# Patient Record
Sex: MALE | Race: WHITE | Employment: OTHER | URBAN - METROPOLITAN AREA
[De-identification: names, ages, dates, MRNs, and addresses within clinical notes are randomized per-mention and may not be internally consistent; named-entity substitution may affect disease eponyms.]

---

## 2017-02-15 ENCOUNTER — OFFICE VISIT (OUTPATIENT)
Dept: CARDIOLOGY CLINIC | Age: 57
End: 2017-02-15

## 2017-02-15 VITALS
WEIGHT: 183.9 LBS | BODY MASS INDEX: 28.87 KG/M2 | DIASTOLIC BLOOD PRESSURE: 86 MMHG | HEIGHT: 67 IN | SYSTOLIC BLOOD PRESSURE: 136 MMHG | OXYGEN SATURATION: 96 % | HEART RATE: 72 BPM | RESPIRATION RATE: 18 BRPM

## 2017-02-15 DIAGNOSIS — I10 ESSENTIAL HYPERTENSION: Primary | ICD-10-CM

## 2017-02-15 DIAGNOSIS — E78.00 HIGH CHOLESTEROL: ICD-10-CM

## 2017-02-15 DIAGNOSIS — I25.10 CORONARY ARTERY DISEASE INVOLVING NATIVE CORONARY ARTERY OF NATIVE HEART WITHOUT ANGINA PECTORIS: ICD-10-CM

## 2017-02-15 DIAGNOSIS — I47.1 SVT (SUPRAVENTRICULAR TACHYCARDIA) (HCC): ICD-10-CM

## 2017-02-15 DIAGNOSIS — I10 ESSENTIAL HYPERTENSION WITH GOAL BLOOD PRESSURE LESS THAN 140/90: ICD-10-CM

## 2017-02-15 DIAGNOSIS — I47.1 PAROXYSMAL SVT (SUPRAVENTRICULAR TACHYCARDIA) (HCC): ICD-10-CM

## 2017-02-15 RX ORDER — ATORVASTATIN CALCIUM 40 MG/1
40 TABLET, FILM COATED ORAL
Qty: 30 TAB | Refills: 12 | Status: SHIPPED | OUTPATIENT
Start: 2017-02-15 | End: 2017-08-23 | Stop reason: SDUPTHER

## 2017-02-15 RX ORDER — LISINOPRIL 20 MG/1
20 TABLET ORAL 2 TIMES DAILY
Qty: 60 TAB | Refills: 12 | Status: SHIPPED | OUTPATIENT
Start: 2017-02-15 | End: 2017-08-23 | Stop reason: SDUPTHER

## 2017-02-15 RX ORDER — METOPROLOL TARTRATE 25 MG/1
25 TABLET, FILM COATED ORAL 2 TIMES DAILY
Qty: 60 TAB | Refills: 12 | Status: SHIPPED | OUTPATIENT
Start: 2017-02-15 | End: 2017-08-23 | Stop reason: SDUPTHER

## 2017-02-15 RX ORDER — HYDROCHLOROTHIAZIDE 12.5 MG/1
TABLET ORAL
Qty: 30 TAB | Refills: 12 | Status: SHIPPED | OUTPATIENT
Start: 2017-02-15 | End: 2017-08-23 | Stop reason: SDUPTHER

## 2017-02-15 NOTE — MR AVS SNAPSHOT
Visit Information Date & Time Provider Department Dept. Phone Encounter #  
 2/15/2017  9:15 AM Momo Hood, [address] [place] [address] [facility] [phone] [id number] Your Appointments 8/23/2017  9:00 AM  
6 MONTH with Momo Hood MD  
[facility]) Appt Note: Per Dr Dimitri Thorpe $CP REM  
 [id number] [facility]  
[phone] [address] [facility] Upcoming Health Maintenance Date Due Hepatitis C Screening 1960 DTaP/Tdap/Td series (1 - Tdap) 8/16/1979 FOBT Q 1 YEAR AGE 50-75 8/16/2008 INFLUENZA AGE 9 TO ADULT 8/1/2016 Allergies as of 2/15/2017  Review Complete On: 2/15/2017 By: Melissa Arriaga NP No Known Allergies Current Immunizations  Reviewed on 7/26/2013 No immunizations on file. Not reviewed this visit You Were Diagnosed With   
  
 Codes Comments Essential hypertension    -  Primary ICD-10-CM: I10 
ICD-9-CM: 401.9 Coronary artery disease involving native coronary artery of native heart without angina pectoris     ICD-10-CM: I25.10 ICD-9-CM: 414.01 Essential hypertension with goal blood pressure less than 140/90     ICD-10-CM: I10 
ICD-9-CM: 401.9 Paroxysmal SVT (supraventricular tachycardia) (HCC)     ICD-10-CM: I47.1 ICD-9-CM: 427.0 High cholesterol     ICD-10-CM: E78.00 ICD-9-CM: 272.0 SVT (supraventricular tachycardia)     ICD-10-CM: I47.1 ICD-9-CM: 427.89 Vitals BP Pulse Resp Height(growth percentile) Weight(growth percentile) SpO2  
 136/86 (BP 1 Location: Right arm, BP Patient Position: Sitting) 72 18 5' 7\" (1.702 m) 183 lb 14.4 oz (83.4 kg) 96% BMI Smoking Status 28.8 kg/m2 Never Smoker Vitals History BMI and BSA Data Body Mass Index Body Surface Area  
 28.8 kg/m 2 1.99 m 2 Preferred Pharmacy Pharmacy Name Phone [address] [place] [address] [phone] Your Updated Medication List  
  
   
This list is accurate as of: 2/15/17  9:25 AM.  Always use your most recent med list.  
  
  
  
  
 aspirin 81 mg chewable tablet Take 1 Tab by mouth daily. atorvastatin 40 mg tablet Commonly known as:  LIPITOR Take 1 Tab by mouth nightly. Indications: mixed hyperlipidemia  
  
 hydroCHLOROthiazide 12.5 mg tablet Commonly known as:  HYDRODIURIL  
TAKE 1 TABLET BY MOUTH DAILY  
  
 lisinopril 20 mg tablet Commonly known as:  Hailey Primmer Take 1 Tab by mouth two (2) times a day. metoprolol tartrate 25 mg tablet Commonly known as:  LOPRESSOR Take 1 Tab by mouth two (2) times a day. nitroglycerin 0.4 mg SL tablet Commonly known as:  NITROSTAT  
1 Tab by SubLINGual route every five (5) minutes as needed for Chest Pain. Max 3 tabs a day Prescriptions Sent to Pharmacy Refills  
 hydroCHLOROthiazide (HYDRODIURIL) 12.5 mg tablet 12 Sig: TAKE 1 TABLET BY MOUTH DAILY Class: Normal  
 Pharmacy: [address] [place] [address] Ph #: [phone]  
 metoprolol tartrate (LOPRESSOR) 25 mg tablet 12 Sig: Take 1 Tab by mouth two (2) times a day. Class: Normal  
 Pharmacy: [address] [place] [address] Ph #: [phone] Route: Oral  
 atorvastatin (LIPITOR) 40 mg tablet 12 Sig: Take 1 Tab by mouth nightly. Indications: mixed hyperlipidemia Class: Normal  
 Pharmacy: [address] [place] [address] #: [phone] Route: Oral  
 lisinopril (PRINIVIL, ZESTRIL) 20 mg tablet 12 Sig: Take 1 Tab by mouth two (2) times a day. Class: Normal  
 Pharmacy: [address] [place] [address] #: [phone] Route: Oral  
  
We Performed the Following AMB POC EKG ROUTINE W/ 12 LEADS, INTER & REP [23987 CPT(R)] Introducing \[facility] [place] [facility]\"" & HEALTH SERVICES! Oscar Daisy introduces [employer] patient portal. Now you can access parts of your medical record, email your doctor's office, and request medication refills online. 1. In your internet browser, go to [email or web address] 2. Click on the First Time User? Click Here link in the Sign In box. You will see the New Member Sign Up page. 3. Enter your V Wave Access Code exactly as it appears below. You will not need to use this code after youve completed the sign-up process. If you do not sign up before the expiration date, you must request a new code. · V Wave Access Code: [email or web address] Expires: 5/16/2017  8:57 AM 
 
4. Enter the last four digits of your Social Security Number (xxxx) and Date of Birth (mm/dd/yyyy) as indicated and click Submit. You will be taken to the next sign-up page. 5. Create a V Wave ID. This will be your V Wave login ID and cannot be changed, so think of one that is secure and easy to remember. 6. Create a V Wave password. You can change your password at any time. 7. Enter your Password Reset Question and Answer. This can be used at a later time if you forget your password. 8. Enter your e-mail address. You will receive e-mail notification when new information is available in [address] 9. Click Sign Up. You can now view and download portions of your medical record. 10. Click the Download Summary menu link to download a portable copy of your medical information. If you have questions, please visit the Frequently Asked Questions section of the V Wave website. Remember, [employer] Wave is NOT to be used for urgent needs. For medical emergencies, dial 911. Now available from your iPhone and Android! Please provide this summary of care documentation to your next provider. Your primary care clinician is listed as [address]. If you have any questions after today's visit, please call [phone].

## 2017-02-15 NOTE — PROGRESS NOTES
Blanquita Height NP  Subjective/HPI:     Leeanna Gracia is a 60 y.o. male is here for routine f/u. The patient denies chest pain/ shortness of breath, orthopnea, PND, LE edema, palpitations, syncope, presyncope or fatigue. Mr. Melanie Dawson reports feeling in his usual state of health. 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 Primary care following lipids. PCP Provider  Kerwin Ramachandran MD  Past Medical History   Diagnosis Date    CAD (coronary artery disease)     Hypertension       Past Surgical History   Procedure Laterality Date    Pr neurological procedure unlisted       nail removal from skull    Pr cardiac surg procedure unlist       1 stent     No Known Allergies   Family History   Problem Relation Age of Onset    Heart Disease Mother     Heart Disease Father       Current Outpatient Prescriptions   Medication Sig    hydroCHLOROthiazide (HYDRODIURIL) 12.5 mg tablet TAKE 1 TABLET BY MOUTH DAILY    metoprolol tartrate (LOPRESSOR) 25 mg tablet Take 1 Tab by mouth two (2) times a day.  atorvastatin (LIPITOR) 40 mg tablet Take 1 Tab by mouth nightly. Indications: mixed hyperlipidemia    lisinopril (PRINIVIL, ZESTRIL) 20 mg tablet Take 1 Tab by mouth two (2) times a day.  nitroglycerin (NITROSTAT) 0.4 mg SL tablet 1 Tab by SubLINGual route every five (5) minutes as needed for Chest Pain. Max 3 tabs a day    aspirin 81 mg chewable tablet Take 1 Tab by mouth daily. No current facility-administered medications for this visit. Vitals:    02/15/17 0900 02/15/17 0906   BP: 138/88 136/86   Pulse: 72    Resp: 18    SpO2: 96%    Weight: 183 lb 14.4 oz (83.4 kg)    Height: 5' 7\" (1.702 m)      Social History     Social History    Marital status:      Spouse name: N/A    Number of children: N/A    Years of education: N/A     Occupational History    Not on file. Social History Main Topics    Smoking status: Never Smoker    Smokeless tobacco: Never Used    Alcohol use No    Drug use: No    Sexual activity: Not on file     Other Topics Concern    Not on file     Social History Narrative    ** Merged History Encounter **            I have reviewed the nurses notes, vitals, problem list, allergy list, medical history, family, social history and medications. Review of Symptoms:    General: Pt denies excessive weight gain or loss. Pt is able to conduct ADL's  HEENT: Denies blurred vision, headaches, epistaxis and difficulty swallowing. Respiratory: Denies shortness of breath, HINOJOSA, wheezing or stridor. Cardiovascular: Denies precordial pain, palpitations, edema or PND  Gastrointestinal: Denies poor appetite, indigestion, abdominal pain or blood in stool  Musculoskeletal: Denies pain or swelling from muscles or joints  Neurologic: Denies tremor, paresthesias, or sensory motor disturbance  Skin: Denies rash, itching or texture change. Physical Exam:      General: Well developed, in no acute distress, cooperative and alert  HEENT: No carotid bruits, no JVD, trach is midline. Neck Supple, PEERL, EOM intact. Heart:  Normal S1/S2 negative S3 or S4. Regular, no murmur, gallop or rub.   Respiratory: Clear bilaterally x 4, no wheezing or rales  Abdomen:   Soft, non-tender, no masses, bowel sounds are active.   Extremities:  No edema, normal cap refill, no cyanosis, atraumatic. Neuro: A&Ox3, speech clear, gait stable. Skin: Skin color is normal. No rashes or lesions. Non diaphoretic  Vascular: 2+ pulses symmetric in all extremities    Cardiographics    ECG: Normal sinus rhythm, right bundle branch block unchanged from previous tracings.   Results for orders placed or performed during the hospital encounter of 07/23/13   EKG, 12 LEAD, INITIAL   Result Value Ref Range    Ventricular Rate 64 BPM    Atrial Rate 64 BPM    P-R Interval 168 ms    QRS Duration 110 ms    Q-T Interval 460 ms    QTC Calculation (Bezet) 474 ms    Calculated P Axis 34 degrees    Calculated R Axis 44 degrees    Calculated T Axis 52 degrees    Diagnosis       Normal sinus rhythm  Incomplete right bundle branch block  When compared with ECG of 23-JUL-2013 17:43,  No significant change  Confirmed by Alberta Martinez ([id number]) on 7/24/2013 9:07:36 AM         Cardiology Labs:  Lab Results   Component Value Date/Time    Cholesterol, total 140 10/15/2014 10:38 AM    HDL Cholesterol 55 10/15/2014 10:38 AM    LDL, calculated 71 10/15/2014 10:38 AM    Triglyceride 72 10/15/2014 10:38 AM    CHOL/HDL Ratio 4.5 07/24/2013 05:15 AM       Lab Results   Component Value Date/Time    Sodium 141 10/15/2014 10:38 AM    Potassium 4.4 10/15/2014 10:38 AM    Chloride 98 10/15/2014 10:38 AM    CO2 27 10/15/2014 10:38 AM    Anion gap 7 08/05/2013 08:30 PM    Glucose 101 10/15/2014 10:38 AM    BUN 20 10/15/2014 10:38 AM    Creatinine 0.80 10/15/2014 10:38 AM    BUN/Creatinine ratio 25 10/15/2014 10:38 AM    GFR est  10/15/2014 10:38 AM    GFR est non- 10/15/2014 10:38 AM    Calcium 10.0 10/15/2014 10:38 AM    Bilirubin, total 0.9 10/15/2014 10:38 AM    AST (SGOT) 12 10/15/2014 10:38 AM    Alk. phosphatase 80 10/15/2014 10:38 AM    Protein, total 6.4 10/15/2014 10:38 AM    Albumin 4.6 10/15/2014 10:38 AM    Globulin 3.2 08/05/2013 08:30 PM    A-G Ratio 2.6 10/15/2014 10:38 AM    ALT (SGPT) 15 10/15/2014 10:38 AM           Assessment:     Assessment:     Chapo was seen today for hypertension. Diagnoses and all orders for this visit:    Essential hypertension  -     AMB POC EKG ROUTINE W/ 12 LEADS, INTER & REP  -     atorvastatin (LIPITOR) 40 mg tablet; Take 1 Tab by mouth nightly. Indications: mixed hyperlipidemia  -     lisinopril (PRINIVIL, ZESTRIL) 20 mg tablet; Take 1 Tab by mouth two (2) times a day.     Coronary artery disease involving native coronary artery of native heart without angina pectoris  - metoprolol tartrate (LOPRESSOR) 25 mg tablet; Take 1 Tab by mouth two (2) times a day. -     atorvastatin (LIPITOR) 40 mg tablet; Take 1 Tab by mouth nightly. Indications: mixed hyperlipidemia  -     lisinopril (PRINIVIL, ZESTRIL) 20 mg tablet; Take 1 Tab by mouth two (2) times a day. Essential hypertension with goal blood pressure less than 140/90  -     metoprolol tartrate (LOPRESSOR) 25 mg tablet; Take 1 Tab by mouth two (2) times a day. Paroxysmal SVT (supraventricular tachycardia) (HCC)  -     metoprolol tartrate (LOPRESSOR) 25 mg tablet; Take 1 Tab by mouth two (2) times a day. -     atorvastatin (LIPITOR) 40 mg tablet; Take 1 Tab by mouth nightly. Indications: mixed hyperlipidemia  -     lisinopril (PRINIVIL, ZESTRIL) 20 mg tablet; Take 1 Tab by mouth two (2) times a day. High cholesterol  -     metoprolol tartrate (LOPRESSOR) 25 mg tablet; Take 1 Tab by mouth two (2) times a day. -     atorvastatin (LIPITOR) 40 mg tablet; Take 1 Tab by mouth nightly. Indications: mixed hyperlipidemia  -     lisinopril (PRINIVIL, ZESTRIL) 20 mg tablet; Take 1 Tab by mouth two (2) times a day. SVT (supraventricular tachycardia)  -     metoprolol tartrate (LOPRESSOR) 25 mg tablet; Take 1 Tab by mouth two (2) times a day. -     atorvastatin (LIPITOR) 40 mg tablet; Take 1 Tab by mouth nightly. Indications: mixed hyperlipidemia  -     lisinopril (PRINIVIL, ZESTRIL) 20 mg tablet; Take 1 Tab by mouth two (2) times a day. Other orders  -     hydroCHLOROthiazide (HYDRODIURIL) 12.5 mg tablet; TAKE 1 TABLET BY MOUTH DAILY        ICD-10-CM ICD-9-CM    1. Essential hypertension I10 401.9 AMB POC EKG ROUTINE W/ 12 LEADS, INTER & REP      atorvastatin (LIPITOR) 40 mg tablet      lisinopril (PRINIVIL, ZESTRIL) 20 mg tablet   2.  Coronary artery disease involving native coronary artery of native heart without angina pectoris I25.10 414.01 metoprolol tartrate (LOPRESSOR) 25 mg tablet      atorvastatin (LIPITOR) 40 mg tablet lisinopril (PRINIVIL, ZESTRIL) 20 mg tablet   3. Essential hypertension with goal blood pressure less than 140/90 I10 401.9 metoprolol tartrate (LOPRESSOR) 25 mg tablet   4. Paroxysmal SVT (supraventricular tachycardia) (HCC) I47.1 427.0 metoprolol tartrate (LOPRESSOR) 25 mg tablet      atorvastatin (LIPITOR) 40 mg tablet      lisinopril (PRINIVIL, ZESTRIL) 20 mg tablet   5. High cholesterol E78.00 272.0 metoprolol tartrate (LOPRESSOR) 25 mg tablet      atorvastatin (LIPITOR) 40 mg tablet      lisinopril (PRINIVIL, ZESTRIL) 20 mg tablet   6. SVT (supraventricular tachycardia) I47.1 427.89 metoprolol tartrate (LOPRESSOR) 25 mg tablet      atorvastatin (LIPITOR) 40 mg tablet      lisinopril (PRINIVIL, ZESTRIL) 20 mg tablet     Orders Placed This Encounter    AMB POC EKG ROUTINE W/ 12 LEADS, INTER & REP     Order Specific Question:   Reason for Exam:     Answer:   routine    hydroCHLOROthiazide (HYDRODIURIL) 12.5 mg tablet     Sig: TAKE 1 TABLET BY MOUTH DAILY     Dispense:  30 Tab     Refill:  12    metoprolol tartrate (LOPRESSOR) 25 mg tablet     Sig: Take 1 Tab by mouth two (2) times a day. Dispense:  60 Tab     Refill:  12    atorvastatin (LIPITOR) 40 mg tablet     Sig: Take 1 Tab by mouth nightly. Indications: mixed hyperlipidemia     Dispense:  30 Tab     Refill:  12    lisinopril (PRINIVIL, ZESTRIL) 20 mg tablet     Sig: Take 1 Tab by mouth two (2) times a day. Dispense:  60 Tab     Refill:  12        Plan:     Patient presents doing well and is stable from cardiac stand point. Continue current care and f/u in 6 months. 1.  Atherosclerotic heart disease: Clinically stable continue current therapy. 2.  Hypertension controlled continue current medications  3. Hyperlipidemia: On atorvastatin 40 mg lipids per primary care  4. History of PSVT: Sinus rhythm with right bundle branch block on EKG; no symptoms reported suggestive of breakthrough.       Eric Roberts NP

## 2017-02-15 NOTE — PATIENT INSTRUCTIONS

## 2017-08-23 ENCOUNTER — OFFICE VISIT (OUTPATIENT)
Dept: CARDIOLOGY CLINIC | Age: 57
End: 2017-08-23

## 2017-08-23 VITALS
DIASTOLIC BLOOD PRESSURE: 86 MMHG | OXYGEN SATURATION: 99 % | RESPIRATION RATE: 16 BRPM | HEIGHT: 67 IN | WEIGHT: 185 LBS | SYSTOLIC BLOOD PRESSURE: 124 MMHG | BODY MASS INDEX: 29.03 KG/M2 | HEART RATE: 61 BPM

## 2017-08-23 DIAGNOSIS — I10 ESSENTIAL HYPERTENSION: ICD-10-CM

## 2017-08-23 DIAGNOSIS — I25.10 CORONARY ARTERY DISEASE INVOLVING NATIVE CORONARY ARTERY OF NATIVE HEART WITHOUT ANGINA PECTORIS: Primary | ICD-10-CM

## 2017-08-23 DIAGNOSIS — I47.1 PAROXYSMAL SVT (SUPRAVENTRICULAR TACHYCARDIA) (HCC): ICD-10-CM

## 2017-08-23 DIAGNOSIS — E78.00 HIGH CHOLESTEROL: ICD-10-CM

## 2017-08-23 RX ORDER — HYDROCHLOROTHIAZIDE 12.5 MG/1
TABLET ORAL
Qty: 90 TAB | Refills: 3 | Status: SHIPPED | OUTPATIENT
Start: 2017-08-23 | End: 2018-02-28 | Stop reason: SDUPTHER

## 2017-08-23 RX ORDER — LISINOPRIL 20 MG/1
20 TABLET ORAL DAILY
Qty: 90 TAB | Refills: 3 | Status: SHIPPED | OUTPATIENT
Start: 2017-08-23 | End: 2018-02-28 | Stop reason: SDUPTHER

## 2017-08-23 RX ORDER — ATORVASTATIN CALCIUM 40 MG/1
40 TABLET, FILM COATED ORAL
Qty: 90 TAB | Refills: 3 | Status: SHIPPED | OUTPATIENT
Start: 2017-08-23 | End: 2018-02-28 | Stop reason: SDUPTHER

## 2017-08-23 RX ORDER — NITROGLYCERIN 0.4 MG/1
0.4 TABLET SUBLINGUAL
Qty: 1 BOTTLE | Refills: 1 | Status: SHIPPED | OUTPATIENT
Start: 2017-08-23 | End: 2018-02-28 | Stop reason: SDUPTHER

## 2017-08-23 RX ORDER — METOPROLOL TARTRATE 25 MG/1
12.5 TABLET, FILM COATED ORAL 2 TIMES DAILY
Qty: 90 TAB | Refills: 3 | Status: SHIPPED | OUTPATIENT
Start: 2017-08-23 | End: 2018-02-28 | Stop reason: SDUPTHER

## 2017-08-23 RX ORDER — METOPROLOL TARTRATE 50 MG/1
TABLET ORAL
Refills: 0 | COMMUNITY
Start: 2017-07-12 | End: 2017-08-23

## 2017-08-23 NOTE — LETTER
8/23/2017 9:30 AM 
 
Patient:  Jamila Lake YOB: 1960 Date of Visit: 8/23/2017 Dear Clint Garza MD 
[address] [place] [address] Road [address] [place] [id number] VIA In Basket 
 : Thank you for referring Mr. Mecca Frankel to me for evaluation/treatment. Below are the relevant portions of my assessment and plan of care. If you have questions, please do not hesitate to call me. I look forward to following Mr. Graceann Jeans along with you.  
 
 
 
Sincerely, 
 
 
Odalis Patrick MD

## 2017-08-23 NOTE — PROGRESS NOTES
Chief Complaint   Patient presents with    Hypertension     6 mo f/u    Coronary Artery Disease     \"

## 2017-08-23 NOTE — PROGRESS NOTES
Subjective/HPI:     Gema Schmitt is a 59 y.o. male is here for f/u appt. All of his medications that he was taking twice a day he cut back to once a day d/t fatigue. Since he made that change he has felt better. He has checked his BP at home and has been trending normal. The patient denies chest pain/ shortness of breath, orthopnea, PND, LE edema, palpitations, syncope, or presyncope. PCP Provider  Melia Harris MD  Past Medical History:   Diagnosis Date    CAD (coronary artery disease)     Hypertension       Past Surgical History:   Procedure Laterality Date    CARDIAC SURG PROCEDURE UNLIST      1 stent    NEUROLOGICAL PROCEDURE UNLISTED      nail removal from skull     No Known Allergies   Family History   Problem Relation Age of Onset    Heart Disease Mother     Heart Disease Father       Current Outpatient Prescriptions   Medication Sig    hydroCHLOROthiazide (HYDRODIURIL) 12.5 mg tablet TAKE 1 TABLET BY MOUTH DAILY    metoprolol tartrate (LOPRESSOR) 25 mg tablet Take 0.5 Tabs by mouth two (2) times a day.  atorvastatin (LIPITOR) 40 mg tablet Take 1 Tab by mouth nightly. Indications: mixed hyperlipidemia    lisinopril (PRINIVIL, ZESTRIL) 20 mg tablet Take 1 Tab by mouth daily.  nitroglycerin (NITROSTAT) 0.4 mg SL tablet 1 Tab by SubLINGual route every five (5) minutes as needed for Chest Pain. Max 3 tabs a day    aspirin 81 mg chewable tablet Take 1 Tab by mouth daily. No current facility-administered medications for this visit. Vitals:    08/23/17 0843 08/23/17 0858   BP: 118/90 124/86   Pulse: 61    Resp: 16    SpO2: 99%    Weight: 185 lb (83.9 kg)    Height: 5' 7\" (1.702 m)      Social History     Social History    Marital status:      Spouse name: N/A    Number of children: N/A    Years of education: N/A     Occupational History    Not on file.      Social History Main Topics    Smoking status: Never Smoker    Smokeless tobacco: Never Used  Alcohol use No    Drug use: No    Sexual activity: Not on file     Other Topics Concern    Not on file     Social History Narrative    ** Merged History Encounter **            I have reviewed the nurses notes, vitals, problem list, allergy list, medical history, family, social history and medications. Review of Symptoms:    General: Pt denies excessive weight gain or loss. Pt is able to conduct ADL's  HEENT: Denies blurred vision, headaches, epistaxis and difficulty swallowing. Respiratory: Denies shortness of breath, HINOJOSA, wheezing or stridor. Cardiovascular: Denies precordial pain, palpitations, edema or PND  Gastrointestinal: Denies poor appetite, indigestion, abdominal pain or blood in stool  Urinary: Denies dysuria, pyuria  Musculoskeletal: Denies pain or swelling from muscles or joints  Neurologic: Denies tremor, paresthesias, or sensory motor disturbance  Skin: Denies rash, itching or texture change. Psych: Denies depression        Physical Exam:      General: Well developed, in no acute distress, cooperative and alert  HEENT: No carotid bruits, no JVD, trach is midline. Neck Supple, PEERL, EOM intact. Heart:  Normal S1/S2 negative S3 or S4. Regular, no murmur, gallop or rub.   Respiratory: Clear bilaterally x 4, no wheezing or rales  Abdomen:   Soft, non-tender, no masses, bowel sounds are active.   Extremities:  No edema, normal cap refill, no cyanosis, atraumatic. Neuro: A&Ox3, speech clear, gait stable. Skin: Skin color is normal. No rashes or lesions. Non diaphoretic  Vascular: 2+ pulses symmetric in all extremities    Cardiographics    ECG: Sinus  Rhythm HR 61  -Right bundle branch block.      Results for orders placed or performed during the hospital encounter of 07/23/13   EKG, 12 LEAD, INITIAL   Result Value Ref Range    Ventricular Rate 64 BPM    Atrial Rate 64 BPM    P-R Interval 168 ms    QRS Duration 110 ms    Q-T Interval 460 ms    QTC Calculation (Bezet) 474 ms    Calculated P Axis 34 degrees    Calculated R Axis 44 degrees    Calculated T Axis 52 degrees    Diagnosis       Normal sinus rhythm  Incomplete right bundle branch block  When compared with ECG of 23-JUL-2013 17:43,  No significant change  Confirmed by Alfonso Harrington ([id number]) on 7/24/2013 9:07:36 AM         Cardiology Labs:  Lab Results   Component Value Date/Time    Cholesterol, total 140 10/15/2014 10:38 AM    HDL Cholesterol 55 10/15/2014 10:38 AM    LDL, calculated 71 10/15/2014 10:38 AM    Triglyceride 72 10/15/2014 10:38 AM    CHOL/HDL Ratio 4.5 07/24/2013 05:15 AM       Lab Results   Component Value Date/Time    Sodium 141 10/15/2014 10:38 AM    Potassium 4.4 10/15/2014 10:38 AM    Chloride 98 10/15/2014 10:38 AM    CO2 27 10/15/2014 10:38 AM    Anion gap 7 08/05/2013 08:30 PM    Glucose 101 10/15/2014 10:38 AM    BUN 20 10/15/2014 10:38 AM    Creatinine 0.80 10/15/2014 10:38 AM    BUN/Creatinine ratio 25 10/15/2014 10:38 AM    GFR est  10/15/2014 10:38 AM    GFR est non- 10/15/2014 10:38 AM    Calcium 10.0 10/15/2014 10:38 AM    AST (SGOT) 12 10/15/2014 10:38 AM    Alk. phosphatase 80 10/15/2014 10:38 AM    Protein, total 6.4 10/15/2014 10:38 AM    Albumin 4.6 10/15/2014 10:38 AM    Globulin 3.2 08/05/2013 08:30 PM    A-G Ratio 2.6 10/15/2014 10:38 AM    ALT (SGPT) 15 10/15/2014 10:38 AM           Assessment:     Assessment:     Diagnoses and all orders for this visit:    1. Coronary artery disease involving native coronary artery of native heart without angina pectoris  -     metoprolol tartrate (LOPRESSOR) 25 mg tablet; Take 0.5 Tabs by mouth two (2) times a day. -     atorvastatin (LIPITOR) 40 mg tablet; Take 1 Tab by mouth nightly. Indications: mixed hyperlipidemia  -     lisinopril (PRINIVIL, ZESTRIL) 20 mg tablet; Take 1 Tab by mouth daily. 2. Essential hypertension  -     AMB POC EKG ROUTINE W/ 12 LEADS, INTER & REP  -     atorvastatin (LIPITOR) 40 mg tablet; Take 1 Tab by mouth nightly. Indications: mixed hyperlipidemia  -     lisinopril (PRINIVIL, ZESTRIL) 20 mg tablet; Take 1 Tab by mouth daily. 3. High cholesterol  -     metoprolol tartrate (LOPRESSOR) 25 mg tablet; Take 0.5 Tabs by mouth two (2) times a day. -     atorvastatin (LIPITOR) 40 mg tablet; Take 1 Tab by mouth nightly. Indications: mixed hyperlipidemia  -     lisinopril (PRINIVIL, ZESTRIL) 20 mg tablet; Take 1 Tab by mouth daily. 4. Paroxysmal SVT (supraventricular tachycardia) (HCC)  -     metoprolol tartrate (LOPRESSOR) 25 mg tablet; Take 0.5 Tabs by mouth two (2) times a day. -     atorvastatin (LIPITOR) 40 mg tablet; Take 1 Tab by mouth nightly. Indications: mixed hyperlipidemia  -     lisinopril (PRINIVIL, ZESTRIL) 20 mg tablet; Take 1 Tab by mouth daily. Other orders  -     hydroCHLOROthiazide (HYDRODIURIL) 12.5 mg tablet; TAKE 1 TABLET BY MOUTH DAILY  -     nitroglycerin (NITROSTAT) 0.4 mg SL tablet; 1 Tab by SubLINGual route every five (5) minutes as needed for Chest Pain. Max 3 tabs a day        ICD-10-CM ICD-9-CM    1. Coronary artery disease involving native coronary artery of native heart without angina pectoris I25.10 414.01 metoprolol tartrate (LOPRESSOR) 25 mg tablet      atorvastatin (LIPITOR) 40 mg tablet      lisinopril (PRINIVIL, ZESTRIL) 20 mg tablet   2. Essential hypertension I10 401.9 AMB POC EKG ROUTINE W/ 12 LEADS, INTER & REP      atorvastatin (LIPITOR) 40 mg tablet      lisinopril (PRINIVIL, ZESTRIL) 20 mg tablet   3. High cholesterol E78.00 272.0 metoprolol tartrate (LOPRESSOR) 25 mg tablet      atorvastatin (LIPITOR) 40 mg tablet      lisinopril (PRINIVIL, ZESTRIL) 20 mg tablet   4.  Paroxysmal SVT (supraventricular tachycardia) (HCC) I47.1 427.0 metoprolol tartrate (LOPRESSOR) 25 mg tablet      atorvastatin (LIPITOR) 40 mg tablet      lisinopril (PRINIVIL, ZESTRIL) 20 mg tablet     Orders Placed This Encounter    AMB POC EKG ROUTINE W/ 12 LEADS, INTER & REP     Order Specific Question:   Reason for Exam:     Answer:   Routine    DISCONTD: metoprolol tartrate (LOPRESSOR) 50 mg tablet     Sig: take 1 tablet by mouth twice a day with MEALS     Refill:  0    hydroCHLOROthiazide (HYDRODIURIL) 12.5 mg tablet     Sig: TAKE 1 TABLET BY MOUTH DAILY     Dispense:  90 Tab     Refill:  3    metoprolol tartrate (LOPRESSOR) 25 mg tablet     Sig: Take 0.5 Tabs by mouth two (2) times a day. Dispense:  90 Tab     Refill:  3    atorvastatin (LIPITOR) 40 mg tablet     Sig: Take 1 Tab by mouth nightly. Indications: mixed hyperlipidemia     Dispense:  90 Tab     Refill:  3    lisinopril (PRINIVIL, ZESTRIL) 20 mg tablet     Sig: Take 1 Tab by mouth daily. Dispense:  90 Tab     Refill:  3    nitroglycerin (NITROSTAT) 0.4 mg SL tablet     Si Tab by SubLINGual route every five (5) minutes as needed for Chest Pain. Max 3 tabs a day     Dispense:  1 Bottle     Refill:  1        Plan:   Patient presents today for f/u appt and denies cardiac complaints. Continue current care and f/u in 6 months. 1.  Atherosclerotic heart disease: Denies CP, fatigue improved with reduction in Metoprolol. Advised he take half tab bid d/t short duration formula  2. Hypertension well controlled continue current medications  3. Hyperlipidemia: On atorvastatin 40 mg lipids per primary care and at goal . Trig 138, HDL 43, LDL 70  4.   History of PSVT: Remains in Sinus rhythm with RBBB on EKG         Gregorio Britton NP

## 2017-08-23 NOTE — MR AVS SNAPSHOT
Visit Information Date & Time Provider Department Dept. Phone Encounter #  
 8/23/2017  9:00 AM Vito Albert, [address] [facility] [phone] [id number] Upcoming Health Maintenance Date Due Hepatitis C Screening 1960 DTaP/Tdap/Td series (1 - Tdap) 8/16/1979 FOBT Q 1 YEAR AGE 50-75 8/16/2008 INFLUENZA AGE 9 TO ADULT 8/1/2017 Allergies as of 8/23/2017  Review Complete On: 8/23/2017 By: Cherry Gutierrez NP No Known Allergies Current Immunizations  Reviewed on 7/26/2013 No immunizations on file. Not reviewed this visit You Were Diagnosed With   
  
 Codes Comments Essential hypertension    -  Primary ICD-10-CM: I10 
ICD-9-CM: 401.9 Coronary artery disease involving native coronary artery of native heart without angina pectoris     ICD-10-CM: I25.10 ICD-9-CM: 414.01 Essential hypertension with goal blood pressure less than 140/90     ICD-10-CM: I10 
ICD-9-CM: 401.9 Paroxysmal SVT (supraventricular tachycardia) (HCC)     ICD-10-CM: I47.1 ICD-9-CM: 427.0 High cholesterol     ICD-10-CM: E78.00 ICD-9-CM: 272.0 SVT (supraventricular tachycardia) (HCC)     ICD-10-CM: I47.1 ICD-9-CM: 427.89 Vitals BP Pulse Resp Height(growth percentile) Weight(growth percentile) SpO2  
 124/86 (BP 1 Location: Left arm, BP Patient Position: Sitting) 61 16 5' 7\" (1.702 m) 185 lb (83.9 kg) 99% BMI Smoking Status 28.98 kg/m2 Never Smoker Vitals History BMI and BSA Data Body Mass Index Body Surface Area  
 28.98 kg/m 2 1.99 m 2 Preferred Pharmacy Pharmacy Name Phone [facility], [address] [phone] Your Updated Medication List  
  
   
This list is accurate as of: 8/23/17  9:25 AM.  Always use your most recent med list.  
  
  
  
  
 aspirin 81 mg chewable tablet Take 1 Tab by mouth daily. atorvastatin 40 mg tablet Commonly known as:  LIPITOR Take 1 Tab by mouth nightly. Indications: mixed hyperlipidemia  
  
 hydroCHLOROthiazide 12.5 mg tablet Commonly known as:  HYDRODIURIL  
TAKE 1 TABLET BY MOUTH DAILY  
  
 lisinopril 20 mg tablet Commonly known as:  Brian Human Take 1 Tab by mouth daily. metoprolol tartrate 25 mg tablet Commonly known as:  LOPRESSOR Take 0.5 Tabs by mouth two (2) times a day. nitroglycerin 0.4 mg SL tablet Commonly known as:  NITROSTAT  
1 Tab by SubLINGual route every five (5) minutes as needed for Chest Pain. Max 3 tabs a day Prescriptions Sent to Pharmacy Refills  
 hydroCHLOROthiazide (HYDRODIURIL) 12.5 mg tablet 3 Sig: TAKE 1 TABLET BY MOUTH DAILY Class: Normal  
 Pharmacy: [address] [place] [address] #: [phone]  
 metoprolol tartrate (LOPRESSOR) 25 mg tablet 3 Sig: Take 0.5 Tabs by mouth two (2) times a day. Class: Normal  
 Pharmacy: [address] [place] [address] #: [phone] Route: Oral  
 atorvastatin (LIPITOR) 40 mg tablet 3 Sig: Take 1 Tab by mouth nightly. Indications: mixed hyperlipidemia Class: Normal  
 Pharmacy: [address] [place] [address] #: [phone] Route: Oral  
 lisinopril (PRINIVIL, ZESTRIL) 20 mg tablet 3 Sig: Take 1 Tab by mouth daily. Class: Normal  
 Pharmacy: [address] [place] [address] #: [phone] Route: Oral  
 nitroglycerin (NITROSTAT) 0.4 mg SL tablet 1 Si Tab by SubLINGual route every five (5) minutes as needed for Chest Pain. Max 3 tabs a day Class: Normal  
 Pharmacy: [address] [place] [address] #: [phone] Route: SubLINGual  
  
We Performed the Following AMB POC EKG ROUTINE  LEADS, INTER & REP [35341 CPT(R)] Introducing [facility]! Jass Gil introduces ThisLife patient portal. Now you can access parts of your medical record, email your doctor's office, and request medication refills online. 1. In your internet browser, go to [email or web address] 2. Click on the First Time User? Click Here link in the Sign In box. You will see the New Member Sign Up page. 3. Enter your ThisLife Access Code exactly as it appears below. You will not need to use this code after youve completed the sign-up process. If you do not sign up before the expiration date, you must request a new code. · ThisLife Access Code: [email or web address] Expires: 11/21/2017  9:25 AM 
 
4. Enter the last four digits of your Social Security Number (xxxx) and Date of Birth (mm/dd/yyyy) as indicated and click Submit. You will be taken to the next sign-up page. 5. Create a ThisLife ID. This will be your ThisLife login ID and cannot be changed, so think of one that is secure and easy to remember. 6. Create a ThisLife password. You can change your password at any time. 7. Enter your Password Reset Question and Answer. This can be used at a later time if you forget your password. 8. Enter your e-mail address. You will receive e-mail notification when new information is available in [address] 9. Click Sign Up. You can now view and download portions of your medical record. 10. Click the Download Summary menu link to download a portable copy of your medical information. If you have questions, please visit the Frequently Asked Questions section of the ThisLife website. Remember, ThisLife is NOT to be used for urgent needs. For medical emergencies, dial 911. Now available from your iPhone and Android! Please provide this summary of care documentation to your next provider. Your primary care clinician is listed as [address]. If you have any questions after today's visit, please call [phone].

## 2018-02-28 ENCOUNTER — OFFICE VISIT (OUTPATIENT)
Dept: CARDIOLOGY CLINIC | Age: 58
End: 2018-02-28

## 2018-02-28 VITALS
BODY MASS INDEX: 30.31 KG/M2 | RESPIRATION RATE: 18 BRPM | HEART RATE: 72 BPM | SYSTOLIC BLOOD PRESSURE: 142 MMHG | OXYGEN SATURATION: 97 % | DIASTOLIC BLOOD PRESSURE: 86 MMHG | WEIGHT: 193.1 LBS | HEIGHT: 67 IN

## 2018-02-28 DIAGNOSIS — E78.00 HIGH CHOLESTEROL: ICD-10-CM

## 2018-02-28 DIAGNOSIS — I47.1 PAROXYSMAL SVT (SUPRAVENTRICULAR TACHYCARDIA) (HCC): ICD-10-CM

## 2018-02-28 DIAGNOSIS — I10 ESSENTIAL HYPERTENSION: ICD-10-CM

## 2018-02-28 DIAGNOSIS — I25.10 CORONARY ARTERY DISEASE INVOLVING NATIVE CORONARY ARTERY OF NATIVE HEART WITHOUT ANGINA PECTORIS: Primary | ICD-10-CM

## 2018-02-28 RX ORDER — ATORVASTATIN CALCIUM 40 MG/1
40 TABLET, FILM COATED ORAL
Qty: 90 TAB | Refills: 3 | Status: SHIPPED | OUTPATIENT
Start: 2018-02-28 | End: 2019-03-23 | Stop reason: SDUPTHER

## 2018-02-28 RX ORDER — HYDROCHLOROTHIAZIDE 12.5 MG/1
TABLET ORAL
Qty: 90 TAB | Refills: 3 | Status: SHIPPED | OUTPATIENT
Start: 2018-02-28 | End: 2019-04-03 | Stop reason: SDUPTHER

## 2018-02-28 RX ORDER — NITROGLYCERIN 0.4 MG/1
0.4 TABLET SUBLINGUAL
Qty: 1 BOTTLE | Refills: 1 | Status: SHIPPED | OUTPATIENT
Start: 2018-02-28 | End: 2019-04-03 | Stop reason: SDUPTHER

## 2018-02-28 RX ORDER — METOPROLOL TARTRATE 25 MG/1
12.5 TABLET, FILM COATED ORAL 2 TIMES DAILY
Qty: 90 TAB | Refills: 3 | Status: SHIPPED | OUTPATIENT
Start: 2018-02-28 | End: 2019-03-09 | Stop reason: SDUPTHER

## 2018-02-28 RX ORDER — LISINOPRIL 20 MG/1
20 TABLET ORAL DAILY
Qty: 90 TAB | Refills: 3 | Status: SHIPPED | OUTPATIENT
Start: 2018-02-28 | End: 2019-03-23 | Stop reason: SDUPTHER

## 2018-02-28 NOTE — PROGRESS NOTES
1. Have you been to the ER, urgent care clinic since your last visit? Hospitalized since your last visit? No    2. Have you seen or consulted any other health care providers outside of the [address] [place] [address] since your last visit? Include any pap smears or colon screening. Yes, Dr. Kathryn Charles 8/2017    Chief Complaint   Patient presents with    Hypertension     6 mo f/u    Coronary Artery Disease     \"     Pt denies any cardiac complaints.

## 2018-02-28 NOTE — MR AVS SNAPSHOT
[address] [facility] [facility] [address] [facility] 
[phone] Patient: Maine Dowd MRN: [id number] DFE:4[id number] Visit Information Date & Time Provider Department Dept. Phone Encounter #  
 2/28/2018  9:15 AM Gómez Cornejo, [address] [place] [address] [facility] [phone] Follow-up Instructions Routing History Upcoming Health Maintenance Date Due Hepatitis C Screening 1960 DTaP/Tdap/Td series (1 - Tdap) 8/16/1979 FOBT Q 1 YEAR AGE 50-75 8/16/2008 Influenza Age 5 to Adult 8/1/2017 Allergies as of 2/28/2018  Review Complete On: 2/28/2018 By: Gómez Cornejo MD  
 No Known Allergies Current Immunizations  Reviewed on 7/26/2013 No immunizations on file. Not reviewed this visit You Were Diagnosed With   
  
 Codes Comments Essential hypertension    -  Primary ICD-10-CM: I10 
ICD-9-CM: 401.9 Coronary artery disease involving native coronary artery of native heart without angina pectoris     ICD-10-CM: I25.10 ICD-9-CM: 414.01 Paroxysmal SVT (supraventricular tachycardia) (HCC)     ICD-10-CM: I47.1 ICD-9-CM: 427.0 High cholesterol     ICD-10-CM: E78.00 ICD-9-CM: 272.0 Vitals BP Pulse Resp Height(growth percentile) Weight(growth percentile) SpO2  
 142/86 (BP 1 Location: Left arm, BP Patient Position: Sitting) 72 18 5' 7\" (1.702 m) 193 lb 1.6 oz (87.6 kg) 97% BMI Smoking Status 30.24 kg/m2 Never Smoker Vitals History BMI and BSA Data Body Mass Index Body Surface Area  
 30.24 kg/m 2 2.03 m 2 Preferred Pharmacy Pharmacy Name Phone [facility] Dillan Hernandez [address] [place] [phone] Your Updated Medication List  
  
   
This list is accurate as of 2/28/18 10:03 AM.  Always use your most recent med list.  
  
  
  
  
 aspirin 81 mg chewable tablet Take 1 Tab by mouth daily. atorvastatin 40 mg tablet Commonly known as:  LIPITOR Take 1 Tab by mouth nightly. Indications: mixed hyperlipidemia  
  
 hydroCHLOROthiazide 12.5 mg tablet Commonly known as:  HYDRODIURIL  
TAKE 1 TABLET BY MOUTH DAILY  
  
 lisinopril 20 mg tablet Commonly known as:  Cleotis Laureano Take 1 Tab by mouth daily. metoprolol tartrate 25 mg tablet Commonly known as:  LOPRESSOR Take 0.5 Tabs by mouth two (2) times a day. nitroglycerin 0.4 mg SL tablet Commonly known as:  NITROSTAT  
1 Tab by SubLINGual route every five (5) minutes as needed for Chest Pain. Max 3 tabs a day Prescriptions Sent to Pharmacy Refills  
 hydroCHLOROthiazide (HYDRODIURIL) 12.5 mg tablet 3 Sig: TAKE 1 TABLET BY MOUTH DAILY Class: Normal  
 Pharmacy: [facility] [address] [facility], [address] Ph #: [phone]  
 metoprolol tartrate (LOPRESSOR) 25 mg tablet 3 Sig: Take 0.5 Tabs by mouth two (2) times a day. Class: Normal  
 Pharmacy: [facility] [address] [facility], [address] Ph #: [phone] Route: Oral  
 atorvastatin (LIPITOR) 40 mg tablet 3 Sig: Take 1 Tab by mouth nightly. Indications: mixed hyperlipidemia Class: Normal  
 Pharmacy: [facility] [address] [facility], [address] Ph #: [phone] Route: Oral  
 lisinopril (PRINIVIL, ZESTRIL) 20 mg tablet 3 Sig: Take 1 Tab by mouth daily. Class: Normal  
 Pharmacy: [facility] [address] [facility], [address] Ph #: [phone] Route: Oral  
 nitroglycerin (NITROSTAT) 0.4 mg SL tablet 1 Si Tab by SubLINGual route every five (5) minutes as needed for Chest Pain. Max 3 tabs a day Class: Normal  
 Pharmacy: [facility] [address] [facility], [address] Ph #: [phone] Route: SubLINGual  
  
We Performed the Following AMB POC EKG ROUTINE W/ 12 LEADS, INTER & REP [58445 CPT(R)] Introducing [facility]! Jennifer Ortega introduces COTA Track patient portal. Now you can access parts of your medical record, email your doctor's office, and request medication refills online. 1. In your internet browser, go to [email or web address] 2. Click on the First Time User? Click Here link in the Sign In box. You will see the New Member Sign Up page. 3. Enter your COTA Track Access Code exactly as it appears below. You will not need to use this code after youve completed the sign-up process. If you do not sign up before the expiration date, you must request a new code. · COTA Track Access Code: [email or web address] Expires: 5/29/2018 10:03 AM 
 
4. Enter the last four digits of your Social Security Number (xxxx) and Date of Birth (mm/dd/yyyy) as indicated and click Submit. You will be taken to the next sign-up page. 5. Create a COTA Track ID. This will be your C[employer] Track login ID and cannot be changed, so think of one that is secure and easy to remember. 6. Create a C[employer] Track password. You can change your password at any time. 7. Enter your Password Reset Question and Answer. This can be used at a later time if you forget your password. 8. Enter your e-mail address. You will receive e-mail notification when new information is available in [address] 9. Click Sign Up. You can now view and download portions of your medical record. 10. Click the Download Summary menu link to download a portable copy of your medical information. If you have questions, please visit the Frequently Asked Questions section of the C[employer] Track website. Remember, [employer] Track is NOT to be used for urgent needs. For medical emergencies, dial 911. Now available from your iPhone and Android! Please provide this summary of care documentation to your next provider. Your primary care clinician is listed as [address]. If you have any questions after today's visit, please call [phone].

## 2018-02-28 NOTE — PROGRESS NOTES
[address], [address] [facility] [address], [address] [facility]  [phone]     Subjective:      Bita Rodriguez is a 59 y.o. male is here for routine f/u on HTN, CAD PCI/BRENDA to LAD 2013, SVT and HLD. The patient denies chest pain/ shortness of breath, orthopnea, PND, LE edema, palpitations, syncope, or presyncope. Stays active. Patient Active Problem List    Diagnosis Date Noted    CAD (coronary artery disease)     SVT (supraventricular tachycardia) ([facility] 07/23/2013    Elevated troponin 07/23/2013    HTN (hypertension) 07/23/2013    ACS [facility] syndrome) [facility] 07/23/2013      Kit Begum MD  Past Medical History:   Diagnosis Date    CAD (coronary artery disease)     Hypertension       Past Surgical History:   Procedure Laterality Date    CARDIAC SURG PROCEDURE UNLIST      1 stent    NEUROLOGICAL PROCEDURE UNLISTED      nail removal from skull     No Known Allergies   Family History   Problem Relation Age of Onset    Heart Disease Mother     Heart Disease Father       Social History     Social History    Marital status:      Spouse name: N/A    Number of children: N/A    Years of education: N/A     Occupational History    Not on file. Social History Main Topics    Smoking status: Never Smoker    Smokeless tobacco: Never Used    Alcohol use No    Drug use: No    Sexual activity: Not on file     Other Topics Concern    Not on file     Social History Narrative    ** Merged History Encounter **           Current Outpatient Prescriptions   Medication Sig    hydroCHLOROthiazide (HYDRODIURIL) 12.5 mg tablet TAKE 1 TABLET BY MOUTH DAILY    metoprolol tartrate (LOPRESSOR) 25 mg tablet Take 0.5 Tabs by mouth two (2) times a day.  atorvastatin (LIPITOR) 40 mg tablet Take 1 Tab by mouth nightly. Indications: mixed hyperlipidemia    lisinopril (PRINIVIL, ZESTRIL) 20 mg tablet Take 1 Tab by mouth daily.     nitroglycerin (NITROSTAT) 0.4 mg SL tablet 1 Tab by SubLINGual route every five (5) minutes as needed for Chest Pain. Max 3 tabs a day    aspirin 81 mg chewable tablet Take 1 Tab by mouth daily. No current facility-administered medications for this visit. Review of Symptoms:  11 systems reviewed, negative other than as stated in the HPI    Physical ExamPhysical Exam:    Vitals:    02/28/18 0855 02/28/18 0903   BP: 132/84 142/86   Pulse: 72    Resp: 18    SpO2: 97%    Weight: 193 lb 1.6 oz (87.6 kg)    Height: 5' 7\" (1.702 m)      Body mass index is 30.24 kg/(m^2). General PE   Gen:  NAD  Mental Status - Alert. General Appearance - Not in acute distress. Chest and Lung Exam   Inspection: Accessory muscles - No use of accessory muscles in breathing. Auscultation:   Breath sounds: - Normal.   Cardiovascular   Inspection: Jugular vein - Bilateral - Inspection Normal.   Palpation/Percussion:   Apical Impulse: - Normal.   Auscultation: Rhythm - Regular. Heart Sounds - S1 WNL and S2 WNL. No S3 or S4. Murmurs & Other Heart Sounds: Auscultation of the heart reveals - No Murmurs. Peripheral Vascular   Upper Extremity: Inspection - Bilateral - No Cyanotic nailbeds or Digital clubbing. Lower Extremity:   Palpation: Edema - Bilateral - No edema. Abdomen:   Soft, non-tender, bowel sounds are active.   Neuro: A&O times 3, CN and motor grossly WNL    Labs:   Lab Results   Component Value Date/Time    Cholesterol, total 140 10/15/2014 10:38 AM    Cholesterol, total 179 07/24/2013 05:15 AM    HDL Cholesterol 55 10/15/2014 10:38 AM    HDL Cholesterol 40 07/24/2013 05:15 AM    LDL, calculated 71 10/15/2014 10:38 AM    LDL, calculated 120.2 (H) 07/24/2013 05:15 AM    Triglyceride 72 10/15/2014 10:38 AM    Triglyceride 94 07/24/2013 05:15 AM    CHOL/HDL Ratio 4.5 07/24/2013 05:15 AM     Lab Results   Component Value Date/Time     07/23/2013 03:45 PM     Lab Results   Component Value Date/Time    Sodium 141 10/15/2014 10:38 AM    Potassium 4.4 10/15/2014 10:38 AM Chloride 98 10/15/2014 10:38 AM    CO2 27 10/15/2014 10:38 AM    Anion gap 7 08/05/2013 08:30 PM    Glucose 101 (H) 10/15/2014 10:38 AM    BUN 20 10/15/2014 10:38 AM    Creatinine 0.80 10/15/2014 10:38 AM    BUN/Creatinine ratio 25 (H) 10/15/2014 10:38 AM    GFR est  10/15/2014 10:38 AM    GFR est non- 10/15/2014 10:38 AM    Calcium 10.0 10/15/2014 10:38 AM    Bilirubin, total 0.9 10/15/2014 10:38 AM    AST (SGOT) 12 10/15/2014 10:38 AM    Alk. phosphatase 80 10/15/2014 10:38 AM    Protein, total 6.4 10/15/2014 10:38 AM    Albumin 4.6 10/15/2014 10:38 AM    Globulin 3.2 08/05/2013 08:30 PM    A-G Ratio 2.6 (H) 10/15/2014 10:38 AM    ALT (SGPT) 15 10/15/2014 10:38 AM       EKG:  SR, RBBB, ventricular rate 67, unchanged from previous tracings     Assessment:     Assessment:      1. Essential hypertension    2. Coronary artery disease involving native coronary artery of native heart without angina pectoris    3. Paroxysmal SVT (supraventricular tachycardia) ([address]    4. High cholesterol        Orders Placed This Encounter    AMB POC EKG ROUTINE W/ 12 LEADS, INTER & REP     Order Specific Question:   Reason for Exam:     Answer:   Routine        Plan:     Patient presents today for f/u appt. 1.  CAD, PCI/BRENDA to LAD 07'13 - on ASA/BB/statin. No cardiac complaints. 2.  Hypertension. Controlled. Monitors BP at home and states SBP 130s. Continue current therapy  3.  Hyperlipidemia: On atorvastatin 40 mg lipids per primary care and at goal 7/17. Trig 138, HDL 43, LDL 70.    4.  History of PSVT: Remains in Sinus rhythm with RBBB on EKG  Doing well with no cardiac symptoms. Lipids and labs followed by PCP. Continue current care and f/u in 6 months.     Valeria Paz MD

## 2018-10-31 ENCOUNTER — OFFICE VISIT (OUTPATIENT)
Dept: CARDIOLOGY CLINIC | Age: 58
End: 2018-10-31

## 2018-10-31 VITALS
DIASTOLIC BLOOD PRESSURE: 90 MMHG | OXYGEN SATURATION: 99 % | WEIGHT: 192.4 LBS | HEIGHT: 67 IN | BODY MASS INDEX: 30.2 KG/M2 | SYSTOLIC BLOOD PRESSURE: 142 MMHG | RESPIRATION RATE: 18 BRPM | HEART RATE: 79 BPM

## 2018-10-31 DIAGNOSIS — I10 HYPERTENSION, UNSPECIFIED TYPE: ICD-10-CM

## 2018-10-31 DIAGNOSIS — I25.10 CORONARY ARTERY DISEASE INVOLVING NATIVE CORONARY ARTERY OF NATIVE HEART WITHOUT ANGINA PECTORIS: Primary | ICD-10-CM

## 2018-10-31 DIAGNOSIS — I47.1 PAROXYSMAL SVT (SUPRAVENTRICULAR TACHYCARDIA) (HCC): ICD-10-CM

## 2018-10-31 DIAGNOSIS — E78.00 HIGH CHOLESTEROL: ICD-10-CM

## 2018-10-31 NOTE — PROGRESS NOTES
[address], [address] [facility]  [phone]     Subjective:      Elisa Gibbs is a 59 y.o. male is here for routine f/u on HTN, CAD PCI/BRENDA to LAD 2013, SVT and HLD. Last seen by me 8 months ago. Patient states he is doing very well overall. He has been checking his BP at home and reports average readings of 120s-140s/80s-90s. He currently has no complaints. The patient denies chest pain/ shortness of breath, orthopnea, PND, LE edema, palpitations, syncope, or presyncope. Stays active.     Patient Active Problem List    Diagnosis Date Noted    CAD (coronary artery disease)     SVT (supraventricular tachycardia) ([facility] 07/23/2013    Elevated troponin 07/23/2013    HTN (hypertension) 07/23/2013    ACS (acute coronary syndrome) ([facility] 07/23/2013      Tere Valdez MD  Past Medical History:   Diagnosis Date    CAD (coronary artery disease)     Hypertension       Past Surgical History:   Procedure Laterality Date    CARDIAC SURG PROCEDURE UNLIST      1 stent    NEUROLOGICAL PROCEDURE UNLISTED      nail removal from skull     No Known Allergies   Family History   Problem Relation Age of Onset    Heart Disease Mother     Heart Disease Father       Social History     Socioeconomic History    Marital status:      Spouse name: Not on file    Number of children: Not on file    Years of education: Not on file    Highest education level: Not on file   Social Needs    Financial resource strain: Not on file    Food insecurity - worry: Not on file    Food insecurity - inability: Not on file   [employer] needs - medical: Not on file   [employer] needs - non-medical: Not on file   Occupational History    Not on file   Tobacco Use    Smoking status: Never Smoker    Smokeless tobacco: Never Used   Substance and Sexual Activity    Alcohol use: No    Drug use: No    Sexual activity: Not on file   Other Topics Concern    Not on file   Social History Narrative    ** Merged History Encounter **           Current Outpatient Medications   Medication Sig    hydroCHLOROthiazide (HYDRODIURIL) 12.5 mg tablet TAKE 1 TABLET BY MOUTH DAILY    metoprolol tartrate (LOPRESSOR) 25 mg tablet Take 0.5 Tabs by mouth two (2) times a day.  atorvastatin (LIPITOR) 40 mg tablet Take 1 Tab by mouth nightly. Indications: mixed hyperlipidemia    lisinopril (PRINIVIL, ZESTRIL) 20 mg tablet Take 1 Tab by mouth daily.  nitroglycerin (NITROSTAT) 0.4 mg SL tablet 1 Tab by SubLINGual route every five (5) minutes as needed for Chest Pain. Max 3 tabs a day    aspirin 81 mg chewable tablet Take 1 Tab by mouth daily. No current facility-administered medications for this visit. Review of Symptoms:  11 systems reviewed, negative other than as stated in the HPI    Physical ExamPhysical Exam:    Vitals:    10/31/18 0921 10/31/18 0928   BP: 138/80 142/90   Pulse: 79    Resp: 18    SpO2: 99%    Weight: 192 lb 6.4 oz (87.3 kg)    Height: 5' 7\" (1.702 m)      Body mass index is 30.13 kg/m². General PE   Gen:  NAD  Mental Status - Alert. General Appearance - Not in acute distress. Chest and Lung Exam   Inspection: Accessory muscles - No use of accessory muscles in breathing. Auscultation:   Breath sounds: - Normal.   Cardiovascular   Inspection: Jugular vein - Bilateral - Inspection Normal.   Palpation/Percussion:   Apical Impulse: - Normal.   Auscultation: Rhythm - Regular. Heart Sounds - S1 WNL and S2 WNL. No S3 or S4. Murmurs & Other Heart Sounds: Auscultation of the heart reveals - No Murmurs. Peripheral Vascular   Upper Extremity: Inspection - Bilateral - No Cyanotic nailbeds or Digital clubbing. Lower Extremity:   Palpation: Edema - Bilateral - No edema. Abdomen:   Soft, non-tender, bowel sounds are active.   Neuro: A&O times 3, CN and motor grossly WNL    Labs:   Lab Results   Component Value Date/Time    Cholesterol, total 140 10/15/2014 10:38 AM    Cholesterol, total 179 07/24/2013 05:15 AM    HDL Cholesterol 55 10/15/2014 10:38 AM    HDL Cholesterol 40 07/24/2013 05:15 AM    LDL, calculated 71 10/15/2014 10:38 AM    LDL, calculated 120.2 (H) 07/24/2013 05:15 AM    Triglyceride 72 10/15/2014 10:38 AM    Triglyceride 94 07/24/2013 05:15 AM    CHOL/HDL Ratio 4.5 07/24/2013 05:15 AM     Lab Results   Component Value Date/Time     07/23/2013 03:45 PM     Lab Results   Component Value Date/Time    Sodium 141 10/15/2014 10:38 AM    Potassium 4.4 10/15/2014 10:38 AM    Chloride 98 10/15/2014 10:38 AM    CO2 27 10/15/2014 10:38 AM    Anion gap 7 08/05/2013 08:30 PM    Glucose 101 (H) 10/15/2014 10:38 AM    BUN 20 10/15/2014 10:38 AM    Creatinine 0.80 10/15/2014 10:38 AM    BUN/Creatinine ratio 25 (H) 10/15/2014 10:38 AM    GFR est  10/15/2014 10:38 AM    GFR est non- 10/15/2014 10:38 AM    Calcium 10.0 10/15/2014 10:38 AM    Bilirubin, total 0.9 10/15/2014 10:38 AM    AST (SGOT) 12 10/15/2014 10:38 AM    Alk. phosphatase 80 10/15/2014 10:38 AM    Protein, total 6.4 10/15/2014 10:38 AM    Albumin 4.6 10/15/2014 10:38 AM    Globulin 3.2 08/05/2013 08:30 PM    A-G Ratio 2.6 (H) 10/15/2014 10:38 AM    ALT (SGPT) 15 10/15/2014 10:38 AM       EKG 10/31/18: SR, RBBB, unchanged from previous     Assessment:     Assessment:      1. Coronary artery disease involving native coronary artery of native heart without angina pectoris    2. Hypertension, unspecified type    3. Paroxysmal SVT (supraventricular tachycardia) ([address]    4. High cholesterol        Orders Placed This Encounter    AMB POC EKG ROUTINE W/ 12 LEADS, INTER & REP     Order Specific Question:   Reason for Exam:     Answer:   routine        Plan:     Patient presents today for f/u appt. 1.  CAD, PCI/BRENDA to LAD 07'13 - on ASA/BB/statin. No cardiac complaints. 2.  Hypertension. Controlled. Monitors BP at home and states BP 120s-140s/80's. Today BP is slightly elevated today to 142/90.  Continue current therapy and continue self monitoring  3.  Hyperlipidemia: On atorvastatin 40 mg lipids per primary care and at goal.    4.  History of PSVT: Remains in Sinus rhythm with RBBB on EKG  Doing well with no cardiac symptoms. Lipids and labs followed by PCP. Continue current care and f/u in 6 months or PRN. Written by Breonna Ríos, as dictated by Dr. Rianna Saldaña.       Wisam Box MD

## 2018-10-31 NOTE — PROGRESS NOTES
Chief Complaint   Patient presents with    Hypertension     6 month follow up     1. Have you been to the ER, urgent care clinic since your last visit? Hospitalized since your last visit? No    2. Have you seen or consulted any other health care providers outside of the [address] [place] [address] since your last visit? Include any pap smears or colon screening.  Yes When: Dr Opal Schwartz 7/12/2018 DOT physical

## 2019-03-09 DIAGNOSIS — I47.1 PAROXYSMAL SVT (SUPRAVENTRICULAR TACHYCARDIA) (HCC): ICD-10-CM

## 2019-03-09 DIAGNOSIS — I25.10 CORONARY ARTERY DISEASE INVOLVING NATIVE CORONARY ARTERY OF NATIVE HEART WITHOUT ANGINA PECTORIS: ICD-10-CM

## 2019-03-09 DIAGNOSIS — E78.00 HIGH CHOLESTEROL: ICD-10-CM

## 2019-03-11 RX ORDER — METOPROLOL TARTRATE 25 MG/1
TABLET, FILM COATED ORAL
Qty: 30 TAB | Refills: 11 | Status: SHIPPED | OUTPATIENT
Start: 2019-03-11 | End: 2019-04-03 | Stop reason: SDUPTHER

## 2019-03-23 DIAGNOSIS — I10 ESSENTIAL HYPERTENSION: ICD-10-CM

## 2019-03-23 DIAGNOSIS — E78.00 HIGH CHOLESTEROL: ICD-10-CM

## 2019-03-23 DIAGNOSIS — I25.10 CORONARY ARTERY DISEASE INVOLVING NATIVE CORONARY ARTERY OF NATIVE HEART WITHOUT ANGINA PECTORIS: ICD-10-CM

## 2019-03-23 DIAGNOSIS — I47.1 PAROXYSMAL SVT (SUPRAVENTRICULAR TACHYCARDIA) (HCC): ICD-10-CM

## 2019-03-27 RX ORDER — ATORVASTATIN CALCIUM 40 MG/1
TABLET, FILM COATED ORAL
Qty: 30 TAB | Refills: 11 | Status: SHIPPED | OUTPATIENT
Start: 2019-03-27 | End: 2019-05-13 | Stop reason: DRUGHIGH

## 2019-03-27 RX ORDER — LISINOPRIL 20 MG/1
TABLET ORAL
Qty: 30 TAB | Refills: 11 | Status: SHIPPED | OUTPATIENT
Start: 2019-03-27 | End: 2019-04-03 | Stop reason: SDUPTHER

## 2019-03-27 NOTE — TELEPHONE ENCOUNTER
Pt called b/c he was told by [address] [place] [address] that they tried putting in the request 3X for his prescription refills. He would like both his lisinopril and lipitor sent to [address] [place] [address] in [address] [place] [address]. Pt is scheduled to see Dr. Hong Jeff on 4/3.  He is completely out of both medications    thanks

## 2019-04-03 ENCOUNTER — OFFICE VISIT (OUTPATIENT)
Dept: CARDIOLOGY CLINIC | Age: 59
End: 2019-04-03

## 2019-04-03 ENCOUNTER — HOSPITAL ENCOUNTER (OUTPATIENT)
Dept: GENERAL RADIOLOGY | Age: 59
Discharge: HOME | End: 2019-04-03
Payer: COMMERCIAL

## 2019-04-03 VITALS
DIASTOLIC BLOOD PRESSURE: 86 MMHG | WEIGHT: 199.4 LBS | BODY MASS INDEX: 31.3 KG/M2 | HEIGHT: 67 IN | SYSTOLIC BLOOD PRESSURE: 130 MMHG | HEART RATE: 74 BPM | OXYGEN SATURATION: 98 % | RESPIRATION RATE: 18 BRPM

## 2019-04-03 DIAGNOSIS — E78.00 HIGH CHOLESTEROL: ICD-10-CM

## 2019-04-03 DIAGNOSIS — I47.1 PAROXYSMAL SVT (SUPRAVENTRICULAR TACHYCARDIA) (HCC): ICD-10-CM

## 2019-04-03 DIAGNOSIS — I25.10 CORONARY ARTERY DISEASE INVOLVING NATIVE CORONARY ARTERY OF NATIVE HEART WITHOUT ANGINA PECTORIS: Primary | ICD-10-CM

## 2019-04-03 DIAGNOSIS — I10 ESSENTIAL HYPERTENSION: ICD-10-CM

## 2019-04-03 DIAGNOSIS — R05.3 PERSISTENT COUGH FOR 3 WEEKS OR LONGER: ICD-10-CM

## 2019-04-03 PROCEDURE — 71046 X-RAY EXAM CHEST 2 VIEWS: CPT

## 2019-04-03 RX ORDER — NITROGLYCERIN 0.4 MG/1
0.4 TABLET SUBLINGUAL
Qty: 1 BOTTLE | Refills: 1 | Status: SHIPPED | OUTPATIENT
Start: 2019-04-03 | End: 2019-10-09 | Stop reason: SDUPTHER

## 2019-04-03 RX ORDER — ATORVASTATIN CALCIUM 40 MG/1
TABLET, FILM COATED ORAL
Qty: 90 TAB | Refills: 3 | Status: CANCELLED | OUTPATIENT
Start: 2019-04-03

## 2019-04-03 RX ORDER — METOPROLOL TARTRATE 25 MG/1
TABLET, FILM COATED ORAL
Qty: 90 TAB | Refills: 3 | Status: SHIPPED | OUTPATIENT
Start: 2019-04-03 | End: 2019-10-09 | Stop reason: SDUPTHER

## 2019-04-03 RX ORDER — HYDROCHLOROTHIAZIDE 12.5 MG/1
TABLET ORAL
Qty: 90 TAB | Refills: 3 | Status: SHIPPED | OUTPATIENT
Start: 2019-04-03 | End: 2019-05-01 | Stop reason: SDUPTHER

## 2019-04-03 RX ORDER — LISINOPRIL 20 MG/1
TABLET ORAL
Qty: 90 TAB | Refills: 3 | Status: SHIPPED | OUTPATIENT
Start: 2019-04-03 | End: 2020-03-20 | Stop reason: SDUPTHER

## 2019-04-03 NOTE — PROGRESS NOTES
1. Have you been to the ER, urgent care clinic since your last visit? Hospitalized since your last visit? Yes Reason for visit: [address] on 3/26/19 for cough x 3 wks    2. Have you seen or consulted any other health care providers outside of the [address] [place] [address] since your last visit? Include any pap smears or colon screening.  No     Chief Complaint   Patient presents with    Cholesterol Problem     6 mo follow up    Hypertension     6 mo follow up

## 2019-04-03 NOTE — PROGRESS NOTES
Brenda Will, JAMALP-BC    Subjective/HPI:     Mr. Atilio Feliz is a 59 y.o. male is here for routine f/u. He has a PMHx of CAD, HTN and HLD. He is doing well. He is recovering from a bad chest cold which required a round of anti-biotics. He is feeling much better now. He reports no complaints of chest pain, dizziness, orthopnea, PND or edema. He denies shortness of breath or palpitations. He stays active working in construction.          PCP Provider  Thania Victor MD  Past Medical History:   Diagnosis Date    CAD (coronary artery disease)     Hypertension       Past Surgical History:   Procedure Laterality Date    CARDIAC SURG PROCEDURE UNLIST      1 stent    NEUROLOGICAL PROCEDURE UNLISTED      nail removal from skull     Family History   Problem Relation Age of Onset    Heart Disease Mother     Heart Disease Father      Social History     Socioeconomic History    Marital status:      Spouse name: Not on file    Number of children: Not on file    Years of education: Not on file    Highest education level: Not on file   Occupational History    Not on file   Social Needs    Financial resource strain: Not on file    Food insecurity:     Worry: Not on file     Inability: Not on file    Transportation needs:     Medical: Not on file     Non-medical: Not on file   Tobacco Use    Smoking status: Never Smoker    Smokeless tobacco: Never Used   Substance and Sexual Activity    Alcohol use: No    Drug use: No    Sexual activity: Not on file   Lifestyle    Physical activity:     Days per week: Not on file     Minutes per session: Not on file    Stress: Not on file   Relationships    Social connections:     Talks on phone: Not on file     Gets together: Not on file     Attends [employer] service: Not on file     Active member of club or organization: Not on file     Attends meetings of clubs or organizations: Not on file     Relationship status: Not on file    Intimate partner violence:     Fear of current or ex partner: Not on file     Emotionally abused: Not on file     Physically abused: Not on file     Forced sexual activity: Not on file   Other Topics Concern    Not on file   Social History Narrative    ** Merged History Encounter **            No Known Allergies     Current Outpatient Medications   Medication Sig    atorvastatin (LIPITOR) 40 mg tablet TAKE 1 TABLET BY MOUTH NIGHTLY    lisinopril (PRINIVIL, ZESTRIL) 20 mg tablet TAKE 1 TABLET BY MOUTH ONCE DAILY    albuterol (PROVENTIL HFA, VENTOLIN HFA, PROAIR HFA) 90 mcg/actuation inhaler Take 2 Puffs by inhalation every four (4) hours as needed for Wheezing.  metoprolol tartrate (LOPRESSOR) 25 mg tablet TAKE 1/2 (ONE-HALF) TABLET BY MOUTH TWICE DAILY    hydroCHLOROthiazide (HYDRODIURIL) 12.5 mg tablet TAKE 1 TABLET BY MOUTH DAILY    nitroglycerin (NITROSTAT) 0.4 mg SL tablet 1 Tab by SubLINGual route every five (5) minutes as needed for Chest Pain. Max 3 tabs a day    aspirin 81 mg chewable tablet Take 1 Tab by mouth daily. No current facility-administered medications for this visit. I have reviewed the problem list, allergy list, medical history, family, social history and medications. Review of Symptoms:    Review of Systems   Constitutional: Negative for chills, fever and weight loss. HENT: Negative for nosebleeds. Eyes: Negative for blurred vision and double vision. Respiratory: Negative for cough, shortness of breath and wheezing. Cardiovascular: Negative for chest pain, palpitations, orthopnea, leg swelling and PND. Gastrointestinal: Negative for abdominal pain, blood in stool, diarrhea, nausea and vomiting. Musculoskeletal: Negative for joint pain. Skin: Negative for rash. Neurological: Negative for dizziness, tingling and loss of consciousness. Endo/Heme/Allergies: Does not bruise/bleed easily.        Physical Exam:      General: Well developed, in no acute distress, cooperative and alert  HEENT: No carotid bruits, no JVD, trach is midline. Neck Supple, PEERL, EOM intact. Heart:  reg rate and rhythm; normal S1/S2; no murmurs, gallops or rubs. Respiratory: Clear bilaterally x 4, no wheezing or rales  Abdomen:   Soft, non-tender, no distention, no masses. + BS. Extremities:  Normal cap refill, no cyanosis, atraumatic. No edema. Neuro: A&Ox3, speech clear, gait stable. Skin: Skin color is normal. No rashes or lesions.  Non diaphoretic  Vascular: 2+ pulses symmetric in all extremities    Vitals:    04/03/19 0843 04/03/19 0852   BP: 138/82 130/86   Pulse: 74    Resp: 18    SpO2: 98%    Weight: 199 lb 6.4 oz (90.4 kg)    Height: 5' 7\" (1.702 m)        Cardiographics    ECG: sinus rhythm with RBBB  Results for orders placed or performed during the hospital encounter of 07/23/13   EKG, 12 LEAD, INITIAL   Result Value Ref Range    Ventricular Rate 64 BPM    Atrial Rate 64 BPM    P-R Interval 168 ms    QRS Duration 110 ms    Q-T Interval 460 ms    QTC Calculation (Bezet) 474 ms    Calculated P Axis 34 degrees    Calculated R Axis 44 degrees    Calculated T Axis 52 degrees    Diagnosis       Normal sinus rhythm  Incomplete right bundle branch block  When compared with ECG of 23-JUL-2013 17:43,  No significant change  Confirmed by Deanne Park ([id number]) on 7/24/2013 9:07:36 AM       Cardiology Labs:  Lab Results   Component Value Date/Time    Cholesterol, total 140 10/15/2014 10:38 AM    HDL Cholesterol 55 10/15/2014 10:38 AM    LDL, calculated 71 10/15/2014 10:38 AM    Triglyceride 72 10/15/2014 10:38 AM    CHOL/HDL Ratio 4.5 07/24/2013 05:15 AM       Lab Results   Component Value Date/Time    Sodium 141 10/15/2014 10:38 AM    Potassium 4.4 10/15/2014 10:38 AM    Chloride 98 10/15/2014 10:38 AM    CO2 27 10/15/2014 10:38 AM    Anion gap 7 08/05/2013 08:30 PM    Glucose 101 (H) 10/15/2014 10:38 AM    BUN 20 10/15/2014 10:38 AM    Creatinine 0.80 10/15/2014 10:38 AM    BUN/Creatinine ratio 25 (H) 10/15/2014 10:38 AM    GFR est  10/15/2014 10:38 AM    GFR est non- 10/15/2014 10:38 AM    Calcium 10.0 10/15/2014 10:38 AM    Bilirubin, total 0.9 10/15/2014 10:38 AM    AST (SGOT) 12 10/15/2014 10:38 AM    Alk. phosphatase 80 10/15/2014 10:38 AM    Protein, total 6.4 10/15/2014 10:38 AM    Albumin 4.6 10/15/2014 10:38 AM    Globulin 3.2 08/05/2013 08:30 PM    A-G Ratio 2.6 (H) 10/15/2014 10:38 AM    ALT (SGPT) 15 10/15/2014 10:38 AM           Assessment:     Assessment:       ICD-10-CM ICD-9-CM    1. Coronary artery disease involving native coronary artery of native heart without angina pectoris I25.10 414.01 lisinopril (PRINIVIL, ZESTRIL) 20 mg tablet      metoprolol tartrate (LOPRESSOR) 25 mg tablet   2. Paroxysmal SVT (supraventricular tachycardia) (HCC) I47.1 427.0 lisinopril (PRINIVIL, ZESTRIL) 20 mg tablet      metoprolol tartrate (LOPRESSOR) 25 mg tablet   3. Essential hypertension I10 401.9 lisinopril (PRINIVIL, ZESTRIL) 20 mg tablet   4. High cholesterol E78.00 272.0 AMB POC EKG ROUTINE W/ 12 LEADS, INTER & REP      lisinopril (PRINIVIL, ZESTRIL) 20 mg tablet      metoprolol tartrate (LOPRESSOR) 25 mg tablet        Plan:     1. Coronary artery disease involving native coronary artery of native heart without angina pectoris  S/p BRENDA to LAD in 2013. Last stress echo in 7/2015 was negative for ischemia, with preserved LVEF. Denies anginal or anginal equivalent symptomss. Continue present medical management and risk factor modification. 2. Paroxysmal SVT (supraventricular tachycardia) (HCC)  No recurrence. Continue rate control therapy. 3. Essential hypertension  BP well controlled; continue anti-hypertensive therapy and low sodium diet. 4. High cholesterol  Continue statin therapy and low fat, low cholesterol diet. Lipid surveillance by PCP annually.       Efren Appiah NP

## 2019-05-01 RX ORDER — HYDROCHLOROTHIAZIDE 12.5 MG/1
TABLET ORAL
Qty: 30 TAB | Refills: 11 | Status: SHIPPED | OUTPATIENT
Start: 2019-05-01 | End: 2019-10-09 | Stop reason: SDUPTHER

## 2019-05-10 ENCOUNTER — TELEPHONE (OUTPATIENT)
Dept: CARDIOLOGY CLINIC | Age: 59
End: 2019-05-10

## 2019-05-10 DIAGNOSIS — E78.00 HIGH CHOLESTEROL: Primary | ICD-10-CM

## 2019-05-10 NOTE — TELEPHONE ENCOUNTER
----- Message from Eather Lesches, NP sent at 5/9/2019  5:01 PM EDT -----  [facility],    Please call the patient andlet him know I reviewed his most recent lipids from 5/2019. LDL is 85, goal is < 70. Please have him increase atorvastatin to 80 mg daily and then have him repeat labs in 3 months. Thanks!   Lucrecia Londono

## 2019-05-13 RX ORDER — ATORVASTATIN CALCIUM 80 MG/1
80 TABLET, FILM COATED ORAL DAILY
COMMUNITY
End: 2019-05-13 | Stop reason: SDUPTHER

## 2019-05-13 RX ORDER — ATORVASTATIN CALCIUM 80 MG/1
80 TABLET, FILM COATED ORAL DAILY
Qty: 30 TAB | Refills: 3 | Status: SHIPPED | OUTPATIENT
Start: 2019-05-13 | End: 2019-09-19 | Stop reason: SDUPTHER

## 2019-05-13 NOTE — TELEPHONE ENCOUNTER
Spoke with patient  Verified patient with 2 patient identifiers    Informed per Malaika Savage NP reviewed  most recent lipids from 5/2019. LDL is 85, goal is < 70. Please increase atorvastatin to 80 mg daily and repeat labs in 3 months. Patient verbalized understanding.

## 2019-06-24 ENCOUNTER — HISTORICAL (OUTPATIENT)
Dept: ADMINISTRATIVE | Facility: HOSPITAL | Age: 59
End: 2019-06-24

## 2019-06-25 ENCOUNTER — HISTORICAL (OUTPATIENT)
Dept: ADMINISTRATIVE | Facility: HOSPITAL | Age: 59
End: 2019-06-25

## 2019-09-19 RX ORDER — ATORVASTATIN CALCIUM 80 MG/1
TABLET, FILM COATED ORAL
Qty: 30 TAB | Refills: 3 | Status: SHIPPED | OUTPATIENT
Start: 2019-09-19 | End: 2019-10-09 | Stop reason: SDUPTHER

## 2019-10-09 ENCOUNTER — OFFICE VISIT (OUTPATIENT)
Dept: CARDIOLOGY CLINIC | Age: 59
End: 2019-10-09

## 2019-10-09 VITALS
DIASTOLIC BLOOD PRESSURE: 90 MMHG | HEIGHT: 67 IN | RESPIRATION RATE: 16 BRPM | BODY MASS INDEX: 30.62 KG/M2 | OXYGEN SATURATION: 98 % | WEIGHT: 195.1 LBS | HEART RATE: 76 BPM | SYSTOLIC BLOOD PRESSURE: 132 MMHG

## 2019-10-09 DIAGNOSIS — E78.00 HIGH CHOLESTEROL: ICD-10-CM

## 2019-10-09 DIAGNOSIS — I10 HYPERTENSION, UNSPECIFIED TYPE: ICD-10-CM

## 2019-10-09 DIAGNOSIS — I25.10 CORONARY ARTERY DISEASE INVOLVING NATIVE CORONARY ARTERY OF NATIVE HEART WITHOUT ANGINA PECTORIS: Primary | ICD-10-CM

## 2019-10-09 DIAGNOSIS — I47.1 PAROXYSMAL SVT (SUPRAVENTRICULAR TACHYCARDIA) (HCC): ICD-10-CM

## 2019-10-09 RX ORDER — HYDROCHLOROTHIAZIDE 12.5 MG/1
TABLET ORAL
Qty: 30 TAB | Refills: 11 | Status: SHIPPED | OUTPATIENT
Start: 2019-10-09 | End: 2020-12-22

## 2019-10-09 RX ORDER — NITROGLYCERIN 0.4 MG/1
0.4 TABLET SUBLINGUAL
Qty: 1 BOTTLE | Refills: 1 | Status: SHIPPED | OUTPATIENT
Start: 2019-10-09

## 2019-10-09 RX ORDER — ATORVASTATIN CALCIUM 80 MG/1
80 TABLET, FILM COATED ORAL
Qty: 90 TAB | Refills: 3 | Status: SHIPPED | OUTPATIENT
Start: 2019-10-09 | End: 2021-01-15

## 2019-10-09 RX ORDER — METOPROLOL TARTRATE 25 MG/1
TABLET, FILM COATED ORAL
Qty: 90 TAB | Refills: 3 | Status: SHIPPED | OUTPATIENT
Start: 2019-10-09 | End: 2020-11-19

## 2019-10-09 NOTE — PROGRESS NOTES
Eva Lott DNP, ANP-BC  Subjective/HPI:     Nilay Joyce is a 62 y.o. male is here for routine f/u. The patient denies chest pain/ shortness of breath, orthopnea, PND, LE edema, palpitations, syncope, presyncope or fatigue. Patient reports feeling well in his usual state of health. 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 He has been compliant on all medications. Tolerating Lipitor 80 mg without malaise arthralgias. PCP Provider  Hans Godwin MD  Past Medical History:   Diagnosis Date    CAD (coronary artery disease)     Hypertension       Past Surgical History:   Procedure Laterality Date    CARDIAC SURG PROCEDURE UNLIST      1 stent    NEUROLOGICAL PROCEDURE UNLISTED      nail removal from skull     No Known Allergies   Family History   Problem Relation Age of Onset    Heart Disease Mother     Heart Disease Father       Current Outpatient Medications   Medication Sig    atorvastatin (LIPITOR) 80 mg tablet TAKE 1 TABLET BY MOUTH ONCE DAILY    hydroCHLOROthiazide (HYDRODIURIL) 12.5 mg tablet TAKE 1 TABLET BY MOUTH ONCE DAILY    lisinopril (PRINIVIL, ZESTRIL) 20 mg tablet TAKE 1 TABLET BY MOUTH ONCE DAILY    metoprolol tartrate (LOPRESSOR) 25 mg tablet TAKE 1/2 (ONE-HALF) TABLET BY MOUTH TWICE DAILY    nitroglycerin (NITROSTAT) 0.4 mg SL tablet 1 Tab by SubLINGual route every five (5) minutes as needed for Chest Pain. Max 3 tabs a day    albuterol (PROVENTIL HFA, VENTOLIN HFA, PROAIR HFA) 90 mcg/actuation inhaler Take 2 Puffs by inhalation every four (4) hours as needed for Wheezing.  aspirin 81 mg chewable tablet Take 1 Tab by mouth daily. No current facility-administered medications for this visit.        Vitals:    10/09/19 0853 10/09/19 0903   BP: 132/80 132/90   Pulse: 76    Resp: 16    SpO2: 98%    Weight: 195 lb 1.6 oz (88.5 kg)    Height: 5' 7\" (1.702 m)      Social History     Socioeconomic History    Marital status:      Spouse name: Not on file    Number of children: Not on file    Years of education: Not on file    Highest education level: Not on file   Occupational History    Not on file   Social Needs    Financial resource strain: Not on file    Food insecurity:     Worry: Not on file     Inability: Not on file    Transportation needs:     Medical: Not on file     Non-medical: Not on file   Tobacco Use    Smoking status: Never Smoker    Smokeless tobacco: Never Used   Substance and Sexual Activity    Alcohol use: No    Drug use: No    Sexual activity: Not on file   Lifestyle    Physical activity:     Days per week: Not on file     Minutes per session: Not on file    Stress: Not on file   Relationships    Social connections:     Talks on phone: Not on file     Gets together: Not on file     Attends Confucianism service: Not on file     Active member of club or organization: Not on file     Attends meetings of clubs or organizations: Not on file     Relationship status: Not on file    Intimate partner violence:     Fear of current or ex partner: Not on file     Emotionally abused: Not on file     Physically abused: Not on file     Forced sexual activity: Not on file   Other Topics Concern    Not on file   Social History Narrative    ** Merged History Encounter **            I have reviewed the nurses notes, vitals, problem list, allergy list, medical history, family, social history and medications. Review of Symptoms:    General: Pt denies excessive weight gain or loss. Pt is able to conduct ADL's  HEENT: Denies blurred vision, headaches, epistaxis and difficulty swallowing. Respiratory: Denies shortness of breath, HINOJOSA, wheezing or stridor.   Cardiovascular: Denies precordial pain, palpitations, edema or PND  Gastrointestinal: Denies poor appetite, indigestion, abdominal pain or blood in stool  Musculoskeletal: Denies pain or swelling from muscles or joints  Neurologic: Denies tremor, paresthesias, or sensory motor disturbance  Skin: Denies rash, itching or texture change. Physical Exam:      General: Well developed, in no acute distress, cooperative and alert  HEENT: No carotid bruits, no JVD, trach is midline. Neck Supple, PERRL, EOM intact. Heart:  Normal S1/S2 negative S3 or S4. Regular, no murmur, gallop or rub. Respiratory: Clear bilaterally x 4, no wheezing or rales  Abdomen:   Soft, non-tender, no masses, bowel sounds are active. Extremities:  No edema, normal cap refill, no cyanosis, atraumatic. Neuro: A&Ox3, speech clear, gait stable. Skin: Skin color is normal. No rashes or lesions.  Non diaphoretic  Vascular: 2+ pulses symmetric in all extremities    Cardiographics    ECG: Sinus rhythm  Results for orders placed or performed during the hospital encounter of 07/23/13   EKG, 12 LEAD, INITIAL   Result Value Ref Range    Ventricular Rate 64 BPM    Atrial Rate 64 BPM    P-R Interval 168 ms    QRS Duration 110 ms    Q-T Interval 460 ms    QTC Calculation (Bezet) 474 ms    Calculated P Axis 34 degrees    Calculated R Axis 44 degrees    Calculated T Axis 52 degrees    Diagnosis       Normal sinus rhythm  Incomplete right bundle branch block  When compared with ECG of 23-JUL-2013 17:43,  No significant change  Confirmed by Ryan Hsieh ([id number]) on 7/24/2013 9:07:36 AM         Cardiology Labs:  Lab Results   Component Value Date/Time    Cholesterol, total 140 10/15/2014 10:38 AM    HDL Cholesterol 55 10/15/2014 10:38 AM    LDL, calculated 71 10/15/2014 10:38 AM    Triglyceride 72 10/15/2014 10:38 AM    CHOL/HDL Ratio 4.5 07/24/2013 05:15 AM       Lab Results   Component Value Date/Time    Sodium 141 10/15/2014 10:38 AM    Potassium 4.4 10/15/2014 10:38 AM    Chloride 98 10/15/2014 10:38 AM    CO2 27 10/15/2014 10:38 AM    Anion gap 7 08/05/2013 08:30 PM    Glucose 101 (H) 10/15/2014 10:38 AM    BUN 20 10/15/2014 10:38 AM    Creatinine 0.80 10/15/2014 10:38 AM    BUN/Creatinine ratio 25 (H) 10/15/2014 10:38 AM    GFR est  10/15/2014 10:38 AM    GFR est non- 10/15/2014 10:38 AM    Calcium 10.0 10/15/2014 10:38 AM    Bilirubin, total 0.9 10/15/2014 10:38 AM    AST (SGOT) 12 10/15/2014 10:38 AM    Alk. phosphatase 80 10/15/2014 10:38 AM    Protein, total 6.4 10/15/2014 10:38 AM    Albumin 4.6 10/15/2014 10:38 AM    Globulin 3.2 08/05/2013 08:30 PM    A-G Ratio 2.6 (H) 10/15/2014 10:38 AM    ALT (SGPT) 15 10/15/2014 10:38 AM           Assessment:     Assessment:     Diagnoses and all orders for this visit:    1. Coronary artery disease involving native coronary artery of native heart without angina pectoris    2. Hypertension, unspecified type  -     AMB POC EKG ROUTINE W/ 12 LEADS, INTER & REP    3. High cholesterol        ICD-10-CM ICD-9-CM    1. Coronary artery disease involving native coronary artery of native heart without angina pectoris I25.10 414.01    2. Hypertension, unspecified type I10 401.9 AMB POC EKG ROUTINE W/ 12 LEADS, INTER & REP   3. High cholesterol E78.00 272.0      Orders Placed This Encounter    AMB POC EKG ROUTINE W/ 12 LEADS, INTER & REP     Order Specific Question:   Reason for Exam:     Answer:   routine        Plan:     1. Atherosclerotic heart disease: History of PTCA stenting of the LAD 2013 clinically stable asymptomatic continue current medications  2. Hypertension: 132/90 discussed with patient sodium restrictions lowering BMI for optimal readings  3. Hyperlipidemia: On atorvastatin 80 mg, recheck lipid panel, if LDL not at goal we will add Zetia. Stable from cardiac perspective follow-up in 6 months  Eneida De Dios, NP      Please note that this dictation was completed with [employer], the [employer] voice recognition software. Quite often unanticipated grammatical, syntax, homophones, and other interpretive errors are inadvertently transcribed by the computer software. Please disregard these errors.   Please excuse any errors that have escaped final proofreading. Thank you.

## 2019-10-09 NOTE — PROGRESS NOTES
Chief Complaint   Patient presents with    Hypertension     6 month follow up     1. Have you been to the ER, urgent care clinic since your last visit? Hospitalized since your last visit? No    2. Have you seen or consulted any other health care providers outside of the [address] [place] [address] since your last visit? Include any pap smears or colon screening.  No

## 2020-03-20 ENCOUNTER — TELEPHONE (OUTPATIENT)
Dept: CARDIOLOGY CLINIC | Age: 60
End: 2020-03-20

## 2020-03-20 DIAGNOSIS — I47.1 PAROXYSMAL SVT (SUPRAVENTRICULAR TACHYCARDIA) (HCC): ICD-10-CM

## 2020-03-20 DIAGNOSIS — I10 ESSENTIAL HYPERTENSION: ICD-10-CM

## 2020-03-20 DIAGNOSIS — I25.10 CORONARY ARTERY DISEASE INVOLVING NATIVE CORONARY ARTERY OF NATIVE HEART WITHOUT ANGINA PECTORIS: ICD-10-CM

## 2020-03-20 DIAGNOSIS — E78.00 HIGH CHOLESTEROL: ICD-10-CM

## 2020-03-20 RX ORDER — LISINOPRIL 20 MG/1
TABLET ORAL
Qty: 90 TAB | Refills: 1 | Status: SHIPPED | OUTPATIENT
Start: 2020-03-20 | End: 2021-03-14

## 2020-03-20 NOTE — TELEPHONE ENCOUNTER
Pt need a refill on this medication.  Please send to [facility] in [place]        lisinopril (PRINIVIL, ZESTRIL) 20 mg tablet      Thanks

## 2020-07-07 NOTE — PROGRESS NOTES
1. Have you been to the ER, urgent care clinic since your last visit? Hospitalized since your last visit? No    2. Have you seen or consulted any other health care providers outside of the [address] [place] [address] since your last visit? Include any pap smears or colon screening. No    Chief Complaint   Patient presents with    Coronary Artery Disease     6 mo appt. Denied cardiac symptoms.

## 2020-07-08 ENCOUNTER — OFFICE VISIT (OUTPATIENT)
Dept: CARDIOLOGY CLINIC | Age: 60
End: 2020-07-08

## 2020-07-08 VITALS
WEIGHT: 186 LBS | BODY MASS INDEX: 29.19 KG/M2 | SYSTOLIC BLOOD PRESSURE: 130 MMHG | HEIGHT: 67 IN | OXYGEN SATURATION: 100 % | RESPIRATION RATE: 18 BRPM | HEART RATE: 60 BPM | DIASTOLIC BLOOD PRESSURE: 84 MMHG

## 2020-07-08 DIAGNOSIS — I47.1 PAROXYSMAL SVT (SUPRAVENTRICULAR TACHYCARDIA) (HCC): ICD-10-CM

## 2020-07-08 DIAGNOSIS — I25.10 ASHD (ARTERIOSCLEROTIC HEART DISEASE): Primary | ICD-10-CM

## 2020-07-08 DIAGNOSIS — I10 ESSENTIAL HYPERTENSION: ICD-10-CM

## 2020-07-08 DIAGNOSIS — E78.2 MIXED HYPERLIPIDEMIA: ICD-10-CM

## 2020-07-08 NOTE — PROGRESS NOTES
[address]  [phone]     Subjective:      Beba Johnson is a 62 y.o. male is here for routine f/u. Last seen in clinic 10/19. Patient reports feeling well in his usual state of health. 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 The patient denies chest pain/ shortness of breath, orthopnea, PND, LE edema, palpitations, syncope, or presyncope.        Patient Active Problem List    Diagnosis Date Noted    High cholesterol 04/03/2019    CAD (coronary artery disease)     SVT (supraventricular tachycardia) (HCC) 07/23/2013    Elevated troponin 07/23/2013    HTN (hypertension) 07/23/2013    ACS (acute coronary syndrome) ([address] 07/23/2013      Joyce Phillips MD  Past Medical History:   Diagnosis Date    CAD (coronary artery disease)     Hypertension       Past Surgical History:   Procedure Laterality Date    CARDIAC SURG PROCEDURE UNLIST      1 stent    NEUROLOGICAL PROCEDURE UNLISTED      nail removal from skull     No Known Allergies   Family History   Problem Relation Age of Onset    Heart Disease Mother     Heart Disease Father       Social History     Socioeconomic History    Marital status:      Spouse name: Not on file    Number of children: Not on file    Years of education: Not on file    Highest education level: Not on file   Occupational History    Not on file   Social Needs    Financial resource strain: Not on file    Food insecurity     Worry: Not on file     Inability: Not on file    Transportation needs     Medical: Not on file     Non-medical: Not on file   Tobacco Use    Smoking status: Never Smoker    Smokeless tobacco: Never Used   Substance and Sexual Activity    Alcohol use: No    Drug use: No    Sexual activity: Not on file   Lifestyle    Physical activity     Days per week: Not on file     Minutes per session: Not on file    Stress: Not on file   Relationships    Social connections     Talks on phone: Not on file     Gets together: Not on file     Attends Rastafarian service: Not on file     Active member of club or organization: Not on file     Attends meetings of clubs or organizations: Not on file     Relationship status: Not on file    Intimate partner violence     Fear of current or ex partner: Not on file     Emotionally abused: Not on file     Physically abused: Not on file     Forced sexual activity: Not on file   Other Topics Concern    Not on file   Social History Narrative    ** Merged History Encounter **           Current Outpatient Medications   Medication Sig    lisinopriL (PRINIVIL, ZESTRIL) 20 mg tablet TAKE 1 TABLET BY MOUTH ONCE DAILY    atorvastatin (LIPITOR) 80 mg tablet Take 1 Tab by mouth nightly.  hydroCHLOROthiazide (HYDRODIURIL) 12.5 mg tablet TAKE 1 TABLET BY MOUTH ONCE DAILY    metoprolol tartrate (LOPRESSOR) 25 mg tablet TAKE 1/2 (ONE-HALF) TABLET BY MOUTH TWICE DAILY    nitroglycerin (NITROSTAT) 0.4 mg SL tablet 1 Tab by SubLINGual route every five (5) minutes as needed for Chest Pain. Max 3 tabs a day    albuterol (PROVENTIL HFA, VENTOLIN HFA, PROAIR HFA) 90 mcg/actuation inhaler Take 2 Puffs by inhalation every four (4) hours as needed for Wheezing.  aspirin 81 mg chewable tablet Take 1 Tab by mouth daily. No current facility-administered medications for this visit. Review of Symptoms:  11 systems reviewed, negative other than as stated in the HPI    Physical ExamPhysical Exam:    Vitals:    07/08/20 0843 07/08/20 0852 07/08/20 0907   BP: 130/84 (!) 148/100 130/84   Pulse: 60     Resp: 18     SpO2: 100%     Weight: 186 lb (84.4 kg)     Height: 5' 7\" (1.702 m)       Body mass index is 29.13 kg/m². General PE  Gen:  NAD  Mental Status - Alert. General Appearance - Not in acute distress.    HEENT:  PERRL, no carotid bruits or JVD  Chest and Lung Exam Inspection: Accessory muscles - No use of accessory muscles in breathing. Auscultation:   Breath sounds: - Normal.   Cardiovascular   Inspection: Jugular vein - Bilateral - Inspection Normal.   Palpation/Percussion:   Apical Impulse: - Normal.   Auscultation: Rhythm - Regular. Heart Sounds - S1 WNL and S2 WNL. No S3 or S4. Murmurs & Other Heart Sounds: Auscultation of the heart reveals - No Murmurs. Peripheral Vascular   Upper Extremity: Inspection - Bilateral - No Cyanotic nailbeds or Digital clubbing. Lower Extremity:   Palpation: Edema - Bilateral - No edema. Abdomen:   Soft, non-tender, bowel sounds are active. Neuro: A&O times 3, CN and motor grossly WNL    Labs:   Lab Results   Component Value Date/Time    Cholesterol, total 139 10/16/2019 01:08 PM    Cholesterol, total 140 10/15/2014 10:38 AM    Cholesterol, total 179 07/24/2013 05:15 AM    HDL Cholesterol 39 (L) 10/16/2019 01:08 PM    HDL Cholesterol 55 10/15/2014 10:38 AM    HDL Cholesterol 40 07/24/2013 05:15 AM    LDL, calculated 84 10/16/2019 01:08 PM    LDL, calculated 71 10/15/2014 10:38 AM    LDL, calculated 120.2 (H) 07/24/2013 05:15 AM    Triglyceride 80 10/16/2019 01:08 PM    Triglyceride 72 10/15/2014 10:38 AM    Triglyceride 94 07/24/2013 05:15 AM    CHOL/HDL Ratio 4.5 07/24/2013 05:15 AM     Lab Results   Component Value Date/Time     07/23/2013 03:45 PM     Lab Results   Component Value Date/Time    Sodium 140 10/16/2019 01:08 PM    Potassium 4.1 10/16/2019 01:08 PM    Chloride 96 10/16/2019 01:08 PM    CO2 27 10/16/2019 01:08 PM    Anion gap 7 08/05/2013 08:30 PM    Glucose 113 (H) 10/16/2019 01:08 PM    BUN 18 10/16/2019 01:08 PM    Creatinine 0.74 (L) 10/16/2019 01:08 PM    BUN/Creatinine ratio 24 10/16/2019 01:08 PM    GFR est  10/16/2019 01:08 PM    GFR est non- 10/16/2019 01:08 PM    Calcium 10.3 (H) 10/16/2019 01:08 PM    Bilirubin, total 1.0 10/16/2019 01:08 PM    Alk.  phosphatase 90 10/16/2019 01:08 PM Protein, total 7.0 10/16/2019 01:08 PM    Albumin 4.3 10/16/2019 01:08 PM    Globulin 3.2 08/05/2013 08:30 PM    A-G Ratio 1.6 10/16/2019 01:08 PM    ALT (SGPT) 36 10/16/2019 01:08 PM       EKG:  NSR RBBB     Assessment:     Assessment:      1. ASHD (arteriosclerotic heart disease)    2. Essential hypertension    3. Mixed hyperlipidemia    4. Paroxysmal SVT (supraventricular tachycardia) (HCC)        Orders Placed This Encounter    AMB POC EKG ROUTINE W/ 12 LEADS, INTER & REP     Order Specific Question:   Reason for Exam:     Answer:   routine        Plan:     Patient presents for f/u, doing well and stable from cardiac standpoint. Last seen in clinic 10/19. Patient reports feeling well in his usual state of health. 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 Atherosclerotic heart disease:   History of PTCA stenting of the LAD 2013   Normal stress echo in 2015  Clinically stable  Continue ASA BB statin    Hypertension:   Repeat 130/84---home bp readings had been 130s/80s. Continue current medical therapy.   Recommend low sodium diet  Stable kidney fxn 5/2020     Hyperlipidemia:   5/2020 LDL at 85 On Atorvastatin at 80 mg  Defers addition of zetia at this time as he has made dietary changes---pcp will repeat labs next mos      Stable from cardiac perspective follow-up in 6 months            Matt Cerda MD

## 2020-11-19 DIAGNOSIS — E78.00 HIGH CHOLESTEROL: ICD-10-CM

## 2020-11-19 DIAGNOSIS — I47.1 PAROXYSMAL SVT (SUPRAVENTRICULAR TACHYCARDIA) (HCC): ICD-10-CM

## 2020-11-19 DIAGNOSIS — I25.10 CORONARY ARTERY DISEASE INVOLVING NATIVE CORONARY ARTERY OF NATIVE HEART WITHOUT ANGINA PECTORIS: ICD-10-CM

## 2020-11-19 RX ORDER — METOPROLOL TARTRATE 25 MG/1
TABLET, FILM COATED ORAL
Qty: 90 TAB | Refills: 0 | Status: SHIPPED | OUTPATIENT
Start: 2020-11-19 | End: 2021-02-19

## 2020-12-22 RX ORDER — HYDROCHLOROTHIAZIDE 12.5 MG/1
TABLET ORAL
Qty: 30 TAB | Refills: 0 | Status: SHIPPED | OUTPATIENT
Start: 2020-12-22 | End: 2021-01-25

## 2021-01-15 RX ORDER — ATORVASTATIN CALCIUM 80 MG/1
TABLET, FILM COATED ORAL
Qty: 90 TAB | Refills: 0 | Status: SHIPPED | OUTPATIENT
Start: 2021-01-15 | End: 2021-04-16

## 2021-01-25 RX ORDER — HYDROCHLOROTHIAZIDE 12.5 MG/1
TABLET ORAL
Qty: 30 TAB | Refills: 5 | Status: SHIPPED | OUTPATIENT
Start: 2021-01-25 | End: 2021-06-04 | Stop reason: SDUPTHER

## 2021-04-16 RX ORDER — ATORVASTATIN CALCIUM 80 MG/1
TABLET, FILM COATED ORAL
Qty: 90 TAB | Refills: 0 | Status: SHIPPED | OUTPATIENT
Start: 2021-04-16 | End: 2021-06-04 | Stop reason: SDUPTHER

## 2021-06-02 ENCOUNTER — TELEPHONE (OUTPATIENT)
Dept: CARDIOLOGY CLINIC | Age: 61
End: 2021-06-02

## 2021-06-02 NOTE — TELEPHONE ENCOUNTER
VM to call me. Received refill request from [facility], which is not listed in pt chart. Does he want refills from them?

## 2021-06-04 ENCOUNTER — TELEPHONE (OUTPATIENT)
Dept: CARDIOLOGY CLINIC | Age: 61
End: 2021-06-04

## 2021-06-04 DIAGNOSIS — E78.00 HIGH CHOLESTEROL: ICD-10-CM

## 2021-06-04 DIAGNOSIS — I10 ESSENTIAL HYPERTENSION: ICD-10-CM

## 2021-06-04 DIAGNOSIS — I25.10 CORONARY ARTERY DISEASE INVOLVING NATIVE CORONARY ARTERY OF NATIVE HEART WITHOUT ANGINA PECTORIS: ICD-10-CM

## 2021-06-04 DIAGNOSIS — I47.1 PAROXYSMAL SVT (SUPRAVENTRICULAR TACHYCARDIA) (HCC): ICD-10-CM

## 2021-06-04 RX ORDER — METOPROLOL TARTRATE 25 MG/1
TABLET, FILM COATED ORAL
Qty: 90 TABLET | Refills: 0 | Status: CANCELLED | OUTPATIENT
Start: 2021-06-04

## 2021-06-04 RX ORDER — LISINOPRIL 20 MG/1
TABLET ORAL
Qty: 90 TABLET | Refills: 1 | Status: CANCELLED | OUTPATIENT
Start: 2021-06-04

## 2021-06-04 RX ORDER — ATORVASTATIN CALCIUM 80 MG/1
TABLET, FILM COATED ORAL
Qty: 90 TABLET | Refills: 0 | Status: CANCELLED | OUTPATIENT
Start: 2021-06-04

## 2021-06-04 RX ORDER — HYDROCHLOROTHIAZIDE 12.5 MG/1
TABLET ORAL
Qty: 30 TABLET | Refills: 5 | Status: CANCELLED | OUTPATIENT
Start: 2021-06-04

## 2021-06-07 RX ORDER — ATORVASTATIN CALCIUM 80 MG/1
TABLET, FILM COATED ORAL
Qty: 90 TABLET | Refills: 0 | Status: SHIPPED | OUTPATIENT
Start: 2021-06-07 | End: 2021-08-19

## 2021-06-07 RX ORDER — METOPROLOL TARTRATE 25 MG/1
TABLET, FILM COATED ORAL
Qty: 90 TABLET | Refills: 0 | Status: SHIPPED | OUTPATIENT
Start: 2021-06-07 | End: 2021-08-19

## 2021-06-07 RX ORDER — HYDROCHLOROTHIAZIDE 12.5 MG/1
TABLET ORAL
Qty: 90 TABLET | Refills: 0 | Status: SHIPPED | OUTPATIENT
Start: 2021-06-07 | End: 2021-08-19

## 2021-06-07 RX ORDER — LISINOPRIL 20 MG/1
TABLET ORAL
Qty: 90 TABLET | Refills: 0 | Status: SHIPPED | OUTPATIENT
Start: 2021-06-07 | End: 2021-08-19

## 2021-06-07 NOTE — TELEPHONE ENCOUNTER
Patient calling to check status of refill pharmacy stating they have not received them yet. Please resend.     Thanks  Brody Friend

## 2021-08-18 DIAGNOSIS — I47.1 PAROXYSMAL SVT (SUPRAVENTRICULAR TACHYCARDIA) (HCC): ICD-10-CM

## 2021-08-18 DIAGNOSIS — E78.00 HIGH CHOLESTEROL: ICD-10-CM

## 2021-08-18 DIAGNOSIS — I25.10 CORONARY ARTERY DISEASE INVOLVING NATIVE CORONARY ARTERY OF NATIVE HEART WITHOUT ANGINA PECTORIS: ICD-10-CM

## 2021-08-19 RX ORDER — ATORVASTATIN CALCIUM 80 MG/1
TABLET, FILM COATED ORAL
Qty: 90 TABLET | Refills: 3 | Status: SHIPPED | OUTPATIENT
Start: 2021-08-19 | End: 2021-11-19

## 2021-08-19 RX ORDER — METOPROLOL TARTRATE 25 MG/1
TABLET, FILM COATED ORAL
Qty: 90 TABLET | Refills: 3 | Status: SHIPPED | OUTPATIENT
Start: 2021-08-19 | End: 2021-09-27

## 2021-08-19 RX ORDER — HYDROCHLOROTHIAZIDE 12.5 MG/1
TABLET ORAL
Qty: 90 TABLET | Refills: 3 | Status: SHIPPED | OUTPATIENT
Start: 2021-08-19 | End: 2021-11-19

## 2021-09-08 ENCOUNTER — OFFICE VISIT (OUTPATIENT)
Dept: CARDIOLOGY CLINIC | Age: 61
End: 2021-09-08

## 2021-09-08 VITALS — HEIGHT: 67 IN | BODY MASS INDEX: 28.33 KG/M2 | WEIGHT: 180.5 LBS

## 2021-09-08 DIAGNOSIS — E78.2 MIXED HYPERLIPIDEMIA: ICD-10-CM

## 2021-09-08 DIAGNOSIS — I10 ESSENTIAL HYPERTENSION: ICD-10-CM

## 2021-09-08 DIAGNOSIS — I25.10 CORONARY ARTERY DISEASE INVOLVING NATIVE CORONARY ARTERY OF NATIVE HEART WITHOUT ANGINA PECTORIS: Primary | ICD-10-CM

## 2021-09-08 DIAGNOSIS — I47.1 PAROXYSMAL SVT (SUPRAVENTRICULAR TACHYCARDIA) (HCC): ICD-10-CM

## 2021-09-08 NOTE — PROGRESS NOTES
1. Have you been to the ER, urgent care clinic since your last visit? Hospitalized since your last visit? No.    2. Have you seen or consulted any other health care providers outside of the [address] [place] [address] since your last visit? Include any pap smears or colon screening.    No.

## 2021-09-27 ENCOUNTER — OFFICE VISIT (OUTPATIENT)
Dept: CARDIOLOGY CLINIC | Age: 61
End: 2021-09-27
Payer: COMMERCIAL

## 2021-09-27 ENCOUNTER — TELEPHONE (OUTPATIENT)
Dept: CARDIOLOGY CLINIC | Age: 61
End: 2021-09-27

## 2021-09-27 VITALS
BODY MASS INDEX: 28.02 KG/M2 | RESPIRATION RATE: 18 BRPM | OXYGEN SATURATION: 99 % | HEART RATE: 72 BPM | WEIGHT: 178.5 LBS | HEIGHT: 67 IN | SYSTOLIC BLOOD PRESSURE: 150 MMHG | DIASTOLIC BLOOD PRESSURE: 90 MMHG

## 2021-09-27 DIAGNOSIS — Z95.5 S/P DRUG ELUTING CORONARY STENT PLACEMENT: ICD-10-CM

## 2021-09-27 DIAGNOSIS — I25.10 CORONARY ARTERY DISEASE INVOLVING NATIVE CORONARY ARTERY OF NATIVE HEART WITHOUT ANGINA PECTORIS: ICD-10-CM

## 2021-09-27 DIAGNOSIS — E78.00 HIGH CHOLESTEROL: ICD-10-CM

## 2021-09-27 DIAGNOSIS — I47.1 PAROXYSMAL SVT (SUPRAVENTRICULAR TACHYCARDIA) (HCC): ICD-10-CM

## 2021-09-27 DIAGNOSIS — E78.2 MIXED HYPERLIPIDEMIA: ICD-10-CM

## 2021-09-27 DIAGNOSIS — I10 ESSENTIAL HYPERTENSION: Primary | ICD-10-CM

## 2021-09-27 PROCEDURE — 99214 OFFICE O/P EST MOD 30 MIN: CPT | Performed by: INTERNAL MEDICINE

## 2021-09-27 PROCEDURE — 93000 ELECTROCARDIOGRAM COMPLETE: CPT | Performed by: INTERNAL MEDICINE

## 2021-09-27 RX ORDER — METFORMIN HYDROCHLORIDE 500 MG/1
1000 TABLET, EXTENDED RELEASE ORAL
COMMUNITY
Start: 2021-09-20

## 2021-09-27 RX ORDER — METOPROLOL TARTRATE 25 MG/1
TABLET, FILM COATED ORAL
Qty: 180 TABLET | Refills: 1 | Status: SHIPPED | OUTPATIENT
Start: 2021-09-27 | End: 2021-09-27

## 2021-09-27 RX ORDER — METOPROLOL SUCCINATE 25 MG/1
25 TABLET, EXTENDED RELEASE ORAL DAILY
Qty: 90 TABLET | Refills: 3 | Status: SHIPPED | OUTPATIENT
Start: 2021-09-27 | End: 2021-11-22 | Stop reason: SDUPTHER

## 2021-09-27 NOTE — LETTER
9/27/2021    Patient: Verna Christianson   YOB: 1960   Date of Visit: 9/27/2021     Lien Adams MD  [place] [id number]  Via Fax: [phone]    Dear Lien Adams MD,      Thank you for referring Mr. Alexandra Tyler to [address] [place] [address] [facility] for evaluation. My notes for this consultation are attached. If you have questions, please do not hesitate to call me. I look forward to following your patient along with you.       Sincerely,    Key Rangel MD

## 2021-09-27 NOTE — TELEPHONE ENCOUNTER
----- Message from Eduardo Iniguez NP sent at 9/27/2021  3:39 PM EDT -----  Can we get labs from pcp thanks

## 2021-09-27 NOTE — PROGRESS NOTES
[address], [address] [facility]  [phone]     Subjective:      Pantera Plascencia is a 59 y.o. male is here for routine f/u. Pmhx CAD, HTN, HLD and DM. Last see by us in 7/2020:  He continues to do well. 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 Has received both of moderna vaccine, did fine. The patient denies chest pain/ shortness of breath, orthopnea, PND, LE edema, palpitations, syncope, or presyncope.        Patient Active Problem List    Diagnosis Date Noted    High cholesterol 04/03/2019    CAD (coronary artery disease)     SVT (supraventricular tachycardia) (HCC) 07/23/2013    Elevated troponin 07/23/2013    HTN (hypertension) 07/23/2013    ACS (acute coronary syndrome) ([facility] [address] 07/23/2013      Maegan Trammell MD  Past Medical History:   Diagnosis Date    Asthma     CAD (coronary artery disease)     Diabetes ([facility] [address]     Hyperlipidemia     Hypertension     Myocardial infarction [facility])       Past Surgical History:   Procedure Laterality Date    HX CORONARY STENT PLACEMENT      HX HEART CATHETERIZATION      HX PTCA      NEUROLOGICAL PROCEDURE UNLISTED      nail removal from skull    CT CARDIAC SURG PROCEDURE UNLIST      1 stent     No Known Allergies   Family History   Problem Relation Age of Onset    Heart Disease Mother     Heart Disease Father       Social History     Socioeconomic History    Marital status:      Spouse name: Not on file    Number of children: Not on file    Years of education: Not on file    Highest education level: Not on file   Occupational History    Not on file   Tobacco Use    Smoking status: Never Smoker    Smokeless tobacco: Never Used   Substance and Sexual Activity    Alcohol use: No    Drug use: No    Sexual activity: Not on file   Other Topics Concern    Not on file   Social History Narrative    ** Merged History Encounter **          Social Determinants of Health     Financial Resource Strain:     Difficulty of Paying Living Expenses:    Food Insecurity:     Worried About Running Out of Food in the Last Year:     [address] in the Last Year:    Transportation Needs:     Lack of Transportation (Medical):  Lack of Transportation (Non-Medical):    Physical Activity:     Days of Exercise per Week:     Minutes of Exercise per Session:    Stress:     Feeling of Stress :    Social Connections:     Frequency of Communication with Friends and Family:     Frequency of Social Gatherings with Friends and Family:     Attends [employer] Services:     Active Member of Clubs or Organizations:     Attends Club or Organization Meetings:     Marital Status:    Intimate Partner Violence:     Fear of Current or Ex-Partner:     Emotionally Abused:     Physically Abused:     Sexually Abused:       Current Outpatient Medications   Medication Sig    metFORMIN ER (GLUCOPHAGE XR) 500 mg tablet Take 1,000 mg by mouth daily (with dinner).  metoprolol succinate (TOPROL-XL) 25 mg XL tablet Take 1 Tablet by mouth daily.  atorvastatin (LIPITOR) 80 mg tablet TAKE 1 TABLET DAILY AT     NIGHT    hydroCHLOROthiazide (HYDRODIURIL) 12.5 mg tablet TAKE 1 TABLET ONCE DAILY    lisinopriL (PRINIVIL, ZESTRIL) 20 mg tablet TAKE 1 TABLET ONCE DAILY    nitroglycerin (NITROSTAT) 0.4 mg SL tablet 1 Tab by SubLINGual route every five (5) minutes as needed for Chest Pain. Max 3 tabs a day    albuterol (PROVENTIL HFA, VENTOLIN HFA, PROAIR HFA) 90 mcg/actuation inhaler Take 2 Puffs by inhalation every four (4) hours as needed for Wheezing.  aspirin 81 mg chewable tablet Take 1 Tab by mouth daily. No current facility-administered medications for this visit.          Review of Symptoms:  11 systems reviewed, negative other than as stated in the HPI    Physical ExamPhysical Exam:    Vitals:    09/27/21 1504 09/27/21 1515   BP: (!) 158/94 (!) 150/90   Pulse: 72    Resp: 18    SpO2: 99%    Weight: 178 lb 8 oz (81 kg)    Height: 5' 7\" (1.702 m)      Body mass index is 27.96 kg/m². General PE  Gen:  NAD  Mental Status - Alert. General Appearance - Not in acute distress. HEENT:  PERRL, no carotid bruits or JVD  Chest and Lung Exam   Inspection: Accessory muscles - No use of accessory muscles in breathing. Auscultation:   Breath sounds: - Normal.   Cardiovascular   Inspection: Jugular vein - Bilateral - Inspection Normal.   Palpation/Percussion:   Apical Impulse: - Normal.   Auscultation: Rhythm - Regular. Heart Sounds - S1 WNL and S2 WNL. No S3 or S4. Murmurs & Other Heart Sounds: Auscultation of the heart reveals - No Murmurs. Peripheral Vascular   Upper Extremity: Inspection - Bilateral - No Cyanotic nailbeds or Digital clubbing. Lower Extremity:   Palpation: Edema - Bilateral - No edema. Abdomen:   Soft, non-tender, bowel sounds are active.   Neuro: A&O times 3, CN and motor grossly WNL    Labs:   Lab Results   Component Value Date/Time    Cholesterol, total 139 10/16/2019 01:08 PM    Cholesterol, total 140 10/15/2014 10:38 AM    Cholesterol, total 179 07/24/2013 05:15 AM    HDL Cholesterol 39 (L) 10/16/2019 01:08 PM    HDL Cholesterol 55 10/15/2014 10:38 AM    HDL Cholesterol 40 07/24/2013 05:15 AM    LDL, calculated 84 10/16/2019 01:08 PM    LDL, calculated 71 10/15/2014 10:38 AM    LDL, calculated 120.2 (H) 07/24/2013 05:15 AM    Triglyceride 80 10/16/2019 01:08 PM    Triglyceride 72 10/15/2014 10:38 AM    Triglyceride 94 07/24/2013 05:15 AM    CHOL/HDL Ratio 4.5 07/24/2013 05:15 AM     Lab Results   Component Value Date/Time     07/23/2013 03:45 PM     Lab Results   Component Value Date/Time    Sodium 140 10/16/2019 01:08 PM    Potassium 4.1 10/16/2019 01:08 PM    Chloride 96 10/16/2019 01:08 PM    CO2 27 10/16/2019 01:08 PM    Anion gap 7 08/05/2013 08:30 PM    Glucose 113 (H) 10/16/2019 01:08 PM    BUN 18 10/16/2019 01:08 PM    Creatinine 0.74 (L) 10/16/2019 01:08 PM    BUN/Creatinine ratio 24 10/16/2019 01:08 PM    GFR est  10/16/2019 01:08 PM    GFR est non- 10/16/2019 01:08 PM    Calcium 10.3 (H) 10/16/2019 01:08 PM    Bilirubin, total 1.0 10/16/2019 01:08 PM    Alk. phosphatase 90 10/16/2019 01:08 PM    Protein, total 7.0 10/16/2019 01:08 PM    Albumin 4.3 10/16/2019 01:08 PM    Globulin 3.2 08/05/2013 08:30 PM    A-G Ratio 1.6 10/16/2019 01:08 PM    ALT (SGPT) 36 10/16/2019 01:08 PM       EKG:  NSR RBBB     Assessment:     Assessment:      1. Essential hypertension    2. Coronary artery disease involving native coronary artery of native heart without angina pectoris    3. Paroxysmal SVT (supraventricular tachycardia) ([address]    4. Mixed hyperlipidemia    5. S/P drug eluting coronary stent placement    6. High cholesterol        Orders Placed This Encounter    AMB POC EKG ROUTINE W/ 12 LEADS, INTER & REP     Order Specific Question:   Reason for Exam:     Answer:   routine    metFORMIN ER (GLUCOPHAGE XR) 500 mg tablet     Sig: Take 1,000 mg by mouth daily (with dinner).  DISCONTD: metoprolol tartrate (LOPRESSOR) 25 mg tablet     Sig: Take 1 tab BID     Dispense:  180 Tablet     Refill:  1    metoprolol succinate (TOPROL-XL) 25 mg XL tablet     Sig: Take 1 Tablet by mouth daily.      Dispense:  90 Tablet     Refill:  3        Plan:     Atherosclerotic heart disease:   History of PTCA stenting of the LAD 2013   Normal stress echo in 2015  Clinically stable  Continue ASA BB statin    Hypertension:   Will increase Metoprolol to 25 mg daily---he has been taking it once a day only   Stable kidney fxn  Serum Cr 0.84 in 8/2020      Hyperlipidemia:   8/2020  On Atorvastatin at 80 mg  He had labs done 4/2021, we will request labwork from PCP and if LDL is not at goal, will add Zetia  5/2020 LDL at 85       DM  On oral agent     Patient seen and examined by me with nurse practitioner. Ben Huggins personally performed all components of the history, physical, and medical decision making and agree with the assessment and plan with minor modifications as noted. Asymptomatic 8 yr post PCI. BP up but only taking metoprolol once a day. Switch to toprol XL.   Check on lipids from Dr Lucy Montiel MD

## 2021-09-27 NOTE — PROGRESS NOTES
1. Have you been to the ER, urgent care clinic since your last visit? Hospitalized since your last visit? No    2. Have you seen or consulted any other health care providers outside of the [address] [place] [address] since your last visit? Include any pap smears or colon screening. No    Chief Complaint   Patient presents with    Coronary Artery Disease     Yearly appt. No cardiac concerns.

## 2021-10-04 ENCOUNTER — TELEPHONE (OUTPATIENT)
Dept: CARDIOLOGY CLINIC | Age: 61
End: 2021-10-04

## 2021-11-18 DIAGNOSIS — I25.10 CORONARY ARTERY DISEASE INVOLVING NATIVE CORONARY ARTERY OF NATIVE HEART WITHOUT ANGINA PECTORIS: ICD-10-CM

## 2021-11-18 DIAGNOSIS — I47.1 PAROXYSMAL SVT (SUPRAVENTRICULAR TACHYCARDIA) (HCC): ICD-10-CM

## 2021-11-18 DIAGNOSIS — E78.00 HIGH CHOLESTEROL: ICD-10-CM

## 2021-11-19 RX ORDER — HYDROCHLOROTHIAZIDE 12.5 MG/1
TABLET ORAL
Qty: 90 TABLET | Refills: 3 | Status: SHIPPED | OUTPATIENT
Start: 2021-11-19

## 2021-11-19 RX ORDER — METOPROLOL TARTRATE 25 MG/1
TABLET, FILM COATED ORAL 2 TIMES DAILY
OUTPATIENT
Start: 2021-11-19

## 2021-11-19 RX ORDER — ATORVASTATIN CALCIUM 80 MG/1
TABLET, FILM COATED ORAL
Qty: 90 TABLET | Refills: 3 | Status: SHIPPED | OUTPATIENT
Start: 2021-11-19

## 2021-11-21 DIAGNOSIS — I47.1 PAROXYSMAL SVT (SUPRAVENTRICULAR TACHYCARDIA) (HCC): ICD-10-CM

## 2021-11-21 DIAGNOSIS — I25.10 CORONARY ARTERY DISEASE INVOLVING NATIVE CORONARY ARTERY OF NATIVE HEART WITHOUT ANGINA PECTORIS: ICD-10-CM

## 2021-11-21 DIAGNOSIS — E78.00 HIGH CHOLESTEROL: ICD-10-CM

## 2021-11-22 RX ORDER — METOPROLOL TARTRATE 25 MG/1
TABLET, FILM COATED ORAL
Qty: 30 TABLET | Refills: 0 | OUTPATIENT
Start: 2021-11-22

## 2021-11-22 RX ORDER — METOPROLOL SUCCINATE 25 MG/1
25 TABLET, EXTENDED RELEASE ORAL DAILY
Qty: 90 TABLET | Refills: 3 | Status: SHIPPED | OUTPATIENT
Start: 2021-11-22 | End: 2021-11-23 | Stop reason: SDUPTHER

## 2021-11-23 RX ORDER — METOPROLOL SUCCINATE 25 MG/1
25 TABLET, EXTENDED RELEASE ORAL DAILY
Qty: 90 TABLET | Refills: 3 | Status: SHIPPED | OUTPATIENT
Start: 2021-11-23

## 2021-11-23 NOTE — TELEPHONE ENCOUNTER
This writer contacted patient in reference to prescriptions questions regarding Metoprolol. Two patient identifiers verified. Patient states he recently had to change pharmacy back to [facility] due to insurance. Patient requesting new RX for Metoprolol sent to [facility].   RX Pending, Pharmacy updated

## 2022-03-29 ENCOUNTER — HISTORICAL (OUTPATIENT)
Dept: ADMINISTRATIVE | Facility: HOSPITAL | Age: 62
End: 2022-03-29

## 2022-07-20 ENCOUNTER — HISTORICAL (OUTPATIENT)
Dept: ADMINISTRATIVE | Facility: HOSPITAL | Age: 62
End: 2022-07-20

## 2023-01-11 RX ORDER — HYDROCHLOROTHIAZIDE 12.5 MG/1
TABLET ORAL
Qty: 30 TABLET | Refills: 0 | OUTPATIENT
Start: 2023-01-11

## 2023-06-12 ENCOUNTER — HOSPITAL ENCOUNTER (OUTPATIENT)
Dept: RADIOLOGY | Facility: HOSPITAL | Age: 63
Discharge: HOME | End: 2023-06-12
Attending: FAMILY MEDICINE
Payer: MEDICARE

## 2023-06-12 DIAGNOSIS — H49.10: ICD-10-CM

## 2023-06-12 PROCEDURE — 70540 MRI ORBIT/FACE/NECK W/O DYE: CPT | Mod: TC

## 2023-06-12 PROCEDURE — 70551 MRI BRAIN STEM W/O DYE: CPT | Mod: TC

## 2023-07-06 ENCOUNTER — LAB VISIT (OUTPATIENT)
Dept: LAB | Facility: HOSPITAL | Age: 63
End: 2023-07-06
Attending: OPHTHALMOLOGY
Payer: MEDICARE

## 2023-07-06 DIAGNOSIS — E11.65 INADEQUATELY CONTROLLED DIABETES MELLITUS: Primary | ICD-10-CM

## 2023-07-06 PROCEDURE — 30000890 HC MISC. SEND OUT TEST: Mod: 59

## 2023-07-06 PROCEDURE — 83519 RIA NONANTIBODY: CPT | Mod: 59

## 2023-07-06 PROCEDURE — 83519 RIA NONANTIBODY: CPT

## 2023-07-06 PROCEDURE — 30000890 GENERAL MISCELLANEOUS TEST (BEAKER)

## 2023-07-06 PROCEDURE — 36415 COLL VENOUS BLD VENIPUNCTURE: CPT

## 2023-07-06 PROCEDURE — 86255 FLUORESCENT ANTIBODY SCREEN: CPT

## 2023-07-06 PROCEDURE — 83516 IMMUNOASSAY NONANTIBODY: CPT | Mod: 59

## 2023-07-11 LAB
BEAKER SEE SCANNED REPORT: NORMAL
BEAKER SEE SCANNED REPORT: NORMAL
MUSK AB SER-SCNC: 0 NMOL/L (ref 0–0.02)

## 2023-07-13 LAB — BEAKER SEE SCANNED REPORT: NORMAL

## 2023-07-17 LAB — ACHR BIND AB SER-SCNC: 0 NMOL/L

## 2023-09-26 DIAGNOSIS — I85.10 SECONDARY ESOPHAGEAL VARICES WITHOUT BLEEDING: Primary | ICD-10-CM

## 2023-09-26 DIAGNOSIS — R13.10 DYSPHAGIA: ICD-10-CM

## 2024-01-22 ENCOUNTER — OUTSIDE PLACE OF SERVICE (OUTPATIENT)
Dept: INTERVENTIONAL RADIOLOGY/VASCULAR | Facility: CLINIC | Age: 64
End: 2024-01-22
Payer: MEDICARE

## 2024-01-22 PROCEDURE — 49083 ABD PARACENTESIS W/IMAGING: CPT | Mod: ,,, | Performed by: RADIOLOGY

## 2024-01-24 ENCOUNTER — HOSPITAL ENCOUNTER (OUTPATIENT)
Dept: RADIOLOGY | Facility: HOSPITAL | Age: 64
Discharge: HOME | End: 2024-01-24
Attending: FAMILY MEDICINE
Payer: MEDICARE

## 2024-01-24 DIAGNOSIS — R18.8 ASCITIC FLUID: ICD-10-CM

## 2024-01-24 PROCEDURE — 76700 US EXAM ABDOM COMPLETE: CPT | Mod: TC

## 2024-02-06 ENCOUNTER — HOSPITAL ENCOUNTER (OUTPATIENT)
Dept: PREADMISSION TESTING | Facility: HOSPITAL | Age: 64
Discharge: HOME | End: 2024-02-06
Attending: FAMILY MEDICINE
Payer: MEDICARE

## 2024-02-06 ENCOUNTER — HOSPITAL ENCOUNTER (OUTPATIENT)
Dept: RADIOLOGY | Facility: HOSPITAL | Age: 64
Discharge: HOME | End: 2024-02-06
Attending: FAMILY MEDICINE
Payer: MEDICARE

## 2024-02-06 VITALS — BODY MASS INDEX: 30.62 KG/M2 | WEIGHT: 231 LBS | HEIGHT: 73 IN

## 2024-02-06 DIAGNOSIS — Z01.818 PREOP TESTING: ICD-10-CM

## 2024-02-06 DIAGNOSIS — R18.8 ASCITIC FLUID: ICD-10-CM

## 2024-02-06 DIAGNOSIS — R18.8 OTHER ASCITES: Primary | ICD-10-CM

## 2024-02-06 LAB
ANION GAP SERPL CALC-SCNC: 6 MEQ/L (ref 2–13)
APTT PPP: 32.3 SECONDS
BASOPHILS # BLD AUTO: 0.04 X10(3)/MCL (ref 0.01–0.08)
BASOPHILS NFR BLD AUTO: 0.8 % (ref 0.1–1.2)
BUN SERPL-MCNC: 10 MG/DL (ref 7–20)
CALCIUM SERPL-MCNC: 8.9 MG/DL (ref 8.4–10.2)
CHLORIDE SERPL-SCNC: 103 MMOL/L (ref 98–110)
CO2 SERPL-SCNC: 30 MMOL/L (ref 21–32)
CREAT SERPL-MCNC: 0.69 MG/DL (ref 0.66–1.25)
CREAT/UREA NIT SERPL: 14 (ref 12–20)
EOSINOPHIL # BLD AUTO: 0.04 X10(3)/MCL (ref 0.04–0.54)
EOSINOPHIL NFR BLD AUTO: 0.8 % (ref 0.7–7)
ERYTHROCYTE [DISTWIDTH] IN BLOOD BY AUTOMATED COUNT: 14.2 %
GFR SERPLBLD CREATININE-BSD FMLA CKD-EPI: >90 MLS/MIN/1.73/M2
GLUCOSE SERPL-MCNC: 132 MG/DL (ref 70–115)
HCT VFR BLD AUTO: 40.4 % (ref 36–52)
HGB BLD-MCNC: 13.7 G/DL (ref 13–18)
IMM GRANULOCYTES # BLD AUTO: 0.01 X10(3)/MCL (ref 0–0.03)
IMM GRANULOCYTES NFR BLD AUTO: 0.2 % (ref 0–0.5)
INR PPP: 1.2
LYMPHOCYTES # BLD AUTO: 1.24 X10(3)/MCL (ref 1.32–3.57)
LYMPHOCYTES NFR BLD AUTO: 23.4 % (ref 20–55)
MCH RBC QN AUTO: 33.5 PG (ref 27–34)
MCHC RBC AUTO-ENTMCNC: 33.9 G/DL (ref 31–37)
MCV RBC AUTO: 98.8 FL (ref 79–99)
MONOCYTES # BLD AUTO: 1.77 X10(3)/MCL (ref 0.3–0.82)
MONOCYTES NFR BLD AUTO: 33.5 % (ref 4.7–12.5)
NEUTROPHILS # BLD AUTO: 2.19 X10(3)/MCL (ref 1.78–5.38)
NEUTROPHILS NFR BLD AUTO: 41.3 % (ref 37–73)
NRBC BLD AUTO-RTO: 0 %
PLATELET # BLD AUTO: 142 X10(3)/MCL (ref 140–371)
PMV BLD AUTO: 10.1 FL (ref 9.4–12.4)
POTASSIUM SERPL-SCNC: 4.5 MMOL/L (ref 3.5–5.1)
PROTHROMBIN TIME: 11.8 SECONDS (ref 9.3–11.9)
RBC # BLD AUTO: 4.09 X10(6)/MCL (ref 4–6)
SODIUM SERPL-SCNC: 139 MMOL/L (ref 135–145)
WBC # SPEC AUTO: 5.29 X10(3)/MCL (ref 4–11.5)

## 2024-02-06 PROCEDURE — 85025 COMPLETE CBC W/AUTO DIFF WBC: CPT | Performed by: FAMILY MEDICINE

## 2024-02-06 PROCEDURE — 76700 US EXAM ABDOM COMPLETE: CPT | Mod: TC

## 2024-02-06 PROCEDURE — 85730 THROMBOPLASTIN TIME PARTIAL: CPT | Performed by: FAMILY MEDICINE

## 2024-02-06 PROCEDURE — 85610 PROTHROMBIN TIME: CPT | Performed by: FAMILY MEDICINE

## 2024-02-06 PROCEDURE — 80048 BASIC METABOLIC PNL TOTAL CA: CPT | Performed by: FAMILY MEDICINE

## 2024-02-06 RX ORDER — OMEPRAZOLE 40 MG/1
40 CAPSULE, DELAYED RELEASE ORAL EVERY MORNING
COMMUNITY
Start: 2024-01-16

## 2024-02-06 RX ORDER — HUMAN INSULIN 100 [USP'U]/ML
48 INJECTION, SUSPENSION SUBCUTANEOUS DAILY
COMMUNITY
Start: 2024-01-16

## 2024-02-06 RX ORDER — OXYCODONE HYDROCHLORIDE 10 MG/1
10 TABLET ORAL 3 TIMES DAILY
COMMUNITY
Start: 2024-01-19

## 2024-02-06 RX ORDER — SEMAGLUTIDE 1.34 MG/ML
2 INJECTION, SOLUTION SUBCUTANEOUS
COMMUNITY
Start: 2023-10-18

## 2024-02-06 RX ORDER — PROMETHAZINE HYDROCHLORIDE 25 MG/1
25 TABLET ORAL
COMMUNITY
Start: 2024-01-29

## 2024-02-06 RX ORDER — AMITRIPTYLINE HYDROCHLORIDE 75 MG/1
75 TABLET ORAL NIGHTLY
COMMUNITY
Start: 2024-01-16

## 2024-02-06 RX ORDER — GABAPENTIN 800 MG/1
800 TABLET ORAL 3 TIMES DAILY
COMMUNITY
Start: 2024-01-16

## 2024-02-06 RX ORDER — DAPAGLIFLOZIN AND METFORMIN HYDROCHLORIDE 10; 1000 MG/1; MG/1
1 TABLET, FILM COATED, EXTENDED RELEASE ORAL DAILY
COMMUNITY
Start: 2024-01-16 | End: 2024-02-23 | Stop reason: ALTCHOICE

## 2024-02-06 RX ORDER — GLIMEPIRIDE 4 MG/1
4 TABLET ORAL 2 TIMES DAILY
COMMUNITY
Start: 2024-01-16

## 2024-02-06 RX ORDER — PROPRANOLOL HYDROCHLORIDE 10 MG/1
10 TABLET ORAL 3 TIMES DAILY
COMMUNITY
Start: 2024-01-16

## 2024-02-06 RX ORDER — PIOGLITAZONEHYDROCHLORIDE 45 MG/1
45 TABLET ORAL NIGHTLY
COMMUNITY
Start: 2023-10-18

## 2024-02-06 RX ORDER — TAMSULOSIN HYDROCHLORIDE 0.4 MG/1
1 CAPSULE ORAL NIGHTLY
COMMUNITY
Start: 2024-01-16

## 2024-02-06 NOTE — DISCHARGE INSTRUCTIONS
Pre-Operative Instructions    ARRIVE AT 6:00AM     Please follow the instructions listed below. Pre-op Anesthesia and Sedation Information Sheet    · Should you develop a cold, temperature, sore throat, cough or any other indication of illness prior to your scheduled procedure, please notify your physician.    · DO NOT EAT OR DRINK ANYTHING AFTER 12:00 MIDNIGHT the night before your surgery or the morning of your procedure until after your procedure is completed and instructed by your nurse. This includes coffee, water, gum, and mints. You may brush your teeth, but do not swallow any water. Do not use any tobacco products the morning of the procedure (including dips, chews, smoking).    · Take all scheduled morning heart/blood pressure, respiratory, and/or thyroid medication prior to arrival on the day of the procedure with a sip of water.    · If you are diabetic, do not take any diabetic medication the day of your surgery.    · If you have medications you take routinely, bring the bottles with you to the hospital.    · Please bathe and shampoo your hair before coming to the hospital.    · Dress casually and wear loose, comfortable clothing. Please remove all make-up and nail polish. You will be given a patient gown for surgery. You must remove all undergarments, jewelry, and dentures unless instructed otherwise. Please leave all valuables at home or in the care of your family.    · Family members are welcome to come with you to the hospital and stay with you in your room.    · . Any questions about your arrival time can be directed to [phone].    · Any questions or further information should be directed to the out-patient nurse at [phone] between the hours of 8:00 a.m. and 3:00 p.m., Monday through Friday.    · You will need a responsible adult to drive you home when you are released from the hospital. An adult/parent must remain in the hospital at all times with a pediatric patient.    · Day of surgery: Go to  [facility].           Patient Information Advice for Surgery Patients    · If you are having surgery or any other invasive procedure, make sure that you and the health care professionals treating you all agree on exactly what will be done during the surgery or procedure.    · Verify the information on your patient identity bracelet. Alert a member of the health care team if the information is incorrect and insist that it be replaced immediately.    · Make sure the operative consent you sign includes the correct information about your surgical site (i.e., right or left) and procedure. Thoroughly read all medical forms and make sure you understand them before you sign anything.    · Some surgical sites will be marked. If appropriate your surgical site will be marked by your physician, actively participate in the process.    · Ask questions and speak up if you have any concerns. Keep asking questions until you understand the answers. Ask members of the healthcare team what steps will be taken to ensure your safety and correct site surgery.    · Take a responsible family member or friend to accompany you to your doctors visits and on the day of your surgery or procedure so that they can serve as your advocate and speak up for you if you are unable.    · Make sure that any post-surgery instructions are given to you prior to your surgery, preferably in writing. Also, make sure a friend or family member is present when the directions are being given. This will help reduce your overall stress and ensure that as many parties as possible are fully informed.          Preventing Surgical Site Infections    What is a Surgical Site Infection (SSI)?    A surgical site infection is an infection that occurs after surgery in the part of the body where the surgery took place. Most patients who have surgery do not develop an infection. However, infections develop in about 1 to 3 out of every 100 patients who have  surgery.  Some of the common symptoms of a surgical site infection are:  Redness and pain around the area where you had surgery ,  Drainage of cloudy fluid from your surgical wound,  Fever    Can SSIs be treated?    Yes. Most surgical site infections can be treated with antibiotics. The antibiotic given to you depends on the bacteria (germs) causing the infection.    What are some of the things that hospitals are doing to prevent SSIs?    To prevent SSIs, doctors, nurses, and other healthcare providers:   Clean their hands and arms up to their elbows with an antiseptic agent just before the surgery.   Clean their hands with soap and water or an alcohol-based hand rub before and after caring for each patient.   May remove some of your hair immediately before your surgery using electric clippers if the hair is in the same area where the procedure will occur. They should not shave you with a razor.   Wear special hair covers, masks, gowns, and gloves during surgery to keep the surgery area clean.   Give you antibiotics before your surgery starts. In most cases, you should get antibiotics within 60 minutes before the surgery starts and the antibiotics should be stopped within 24 hours after surgery.   Clean the skin at the site of your surgery with a special soap that kills germs.    What can I do to help prevent SSIs?    Before your surgery:     Tell your doctor about other medical problems you may have. Health problems such as allergies, diabetes, and obesity could affect your surgery and your treatment.   Quit smoking. Patients who smoke get more infections. Talk to your doctor about how you can quit.   Do not shave near where you will have surgery. Shaving with a razor can irritate your skin and make it easier to develop an infection.    After your surgery:     Make sure that your healthcare providers clean their hands before examining you, either with soap and water or an alcohol-based hand rub.   Family and  friends who visit you should not touch the surgical wound or dressings.   Family and friends should clean their hands with soap and water or an alcohol-based hand rub before and after visiting you. If you do not see them clean their hands, ask them to clean their hands.    What do I need to do when I go home from the hospital?     Before you go home, your doctor or nurse should explain everything you need to know about taking care of your wound. Make sure you understand how to care for your wound before you leave the hospital.   Always clean your hands before and after caring for your wound.   Before you go home, make sure you know who to contact if you have questions or problems.   If you have any symptoms of an infection, such as redness and pain at the surgery site, drainage, or fever, call your doctor immediately.    If you have additional questions, please ask your doctor or nurse.    If you do not see your providers clean their hands, please ask them to do so.

## 2024-02-07 ENCOUNTER — HOSPITAL ENCOUNTER (OUTPATIENT)
Dept: RADIOLOGY | Facility: HOSPITAL | Age: 64
Discharge: HOME | End: 2024-02-07
Attending: FAMILY MEDICINE
Payer: MEDICARE

## 2024-02-07 VITALS
DIASTOLIC BLOOD PRESSURE: 63 MMHG | SYSTOLIC BLOOD PRESSURE: 116 MMHG | OXYGEN SATURATION: 100 % | TEMPERATURE: 98 F | RESPIRATION RATE: 20 BRPM | HEART RATE: 79 BPM

## 2024-02-07 DIAGNOSIS — Z01.818 PREOP TESTING: ICD-10-CM

## 2024-02-07 DIAGNOSIS — R18.8 OTHER ASCITES: ICD-10-CM

## 2024-02-07 DIAGNOSIS — R18.8 ASCITIC FLUID: ICD-10-CM

## 2024-02-07 LAB
ALBUMIN FLD-MCNC: 0.7 GM/DL
AMYLASE FLD-CCNC: 12 U/L
GLUCOSE FLD-MCNC: 100 MG/DL
LYMPHOCYTE MANUAL BF (OHS): 91 %
MACROPHAGES, FLUID MAN COUNT (OHS): 54
MESOTHELIAL CELLS, FLUID MAN COUNT (OHS): 1
MONOCYTE MANUAL BF (OHS): 5 %
NEUTROPHILS MAN BF (OHS): 4 %
POCT GLUCOSE: 85 MG/DL (ref 70–110)
PROT FLD-MCNC: 1.1 GM/DL
WBC # FLD AUTO: 194 /UL

## 2024-02-07 PROCEDURE — 83615 LACTATE (LD) (LDH) ENZYME: CPT

## 2024-02-07 PROCEDURE — 88305 TISSUE EXAM BY PATHOLOGIST: CPT | Performed by: FAMILY MEDICINE

## 2024-02-07 PROCEDURE — 25000003 PHARM REV CODE 250: Performed by: FAMILY MEDICINE

## 2024-02-07 PROCEDURE — 49083 ABD PARACENTESIS W/IMAGING: CPT

## 2024-02-07 PROCEDURE — 88112 CYTOPATH CELL ENHANCE TECH: CPT

## 2024-02-07 PROCEDURE — 82150 ASSAY OF AMYLASE: CPT

## 2024-02-07 PROCEDURE — 82945 GLUCOSE OTHER FLUID: CPT

## 2024-02-07 PROCEDURE — 82042 OTHER SOURCE ALBUMIN QUAN EA: CPT

## 2024-02-07 PROCEDURE — 89051 BODY FLUID CELL COUNT: CPT | Performed by: FAMILY MEDICINE

## 2024-02-07 PROCEDURE — 84157 ASSAY OF PROTEIN OTHER: CPT

## 2024-02-07 RX ORDER — LIDOCAINE HYDROCHLORIDE 10 MG/ML
10 INJECTION INFILTRATION; PERINEURAL ONCE
Status: COMPLETED | OUTPATIENT
Start: 2024-02-07 | End: 2024-02-07

## 2024-02-07 RX ORDER — IBUPROFEN 600 MG/1
600 TABLET ORAL EVERY 6 HOURS PRN
Status: DISCONTINUED | OUTPATIENT
Start: 2024-02-07 | End: 2024-02-08 | Stop reason: HOSPADM

## 2024-02-07 RX ADMIN — LIDOCAINE HYDROCHLORIDE 10 ML: 10 INJECTION, SOLUTION INFILTRATION; PERINEURAL at 07:02

## 2024-02-07 NOTE — PLAN OF CARE
Returned to room per stretcher. Awake, alert. No complaints. Iced water given and tolerating well.  Pressure dressing bandage dry and intact. Breakfast tray ordered.

## 2024-02-07 NOTE — DISCHARGE INSTRUCTIONS
Remove bandaid in 24 hours, clean with antibacterial soap and water, dry thoroughly, leave open to air unless site is draining, put a bandaid.    Clean daily.    Ibuprofen 600mg every 6 hours as needed for pain.    Notify your MD:  Any signs of infection - fever above 100.4, yellow/green drainage from the site.  Swelling at site.  Excessive bleeding from site.  Excessive nausea/vomiting.  Difficulty breathing or swallowing.

## 2024-02-07 NOTE — PROGRESS NOTES
PRE /68  PRE O2 95  PRE HR 77      5000 MLS CALLED TO T1 [place] 0715    POST /74  POST HR 77  POST O2 97      5300 MLS REMOVED

## 2024-02-08 LAB
BODY FLD TYPE: NORMAL
LDH FLD L TO P-CCNC: 46 U/L

## 2024-02-22 ENCOUNTER — NURSE TRIAGE (OUTPATIENT)
Dept: ADMINISTRATIVE | Facility: CLINIC | Age: 64
End: 2024-02-22
Payer: MEDICARE

## 2024-02-22 ENCOUNTER — HOSPITAL ENCOUNTER (EMERGENCY)
Facility: HOSPITAL | Age: 64
Discharge: HOME | End: 2024-02-22
Attending: FAMILY MEDICINE
Payer: MEDICARE

## 2024-02-22 VITALS
HEIGHT: 73 IN | OXYGEN SATURATION: 96 % | DIASTOLIC BLOOD PRESSURE: 65 MMHG | SYSTOLIC BLOOD PRESSURE: 103 MMHG | RESPIRATION RATE: 20 BRPM | TEMPERATURE: 98 F | WEIGHT: 234.63 LBS | HEART RATE: 88 BPM | BODY MASS INDEX: 31.1 KG/M2

## 2024-02-22 DIAGNOSIS — R18.8 ASCITES: Primary | ICD-10-CM

## 2024-02-22 LAB
ALBUMIN SERPL-MCNC: 3.7 G/DL (ref 3.4–5)
ALBUMIN/GLOB SERPL: 1 RATIO
ALP SERPL-CCNC: 181 UNIT/L (ref 50–144)
ALT SERPL-CCNC: 48 UNIT/L (ref 1–45)
ANION GAP SERPL CALC-SCNC: 5 MEQ/L (ref 2–13)
APTT PPP: 29.1 SECONDS (ref 23–29.4)
AST SERPL-CCNC: 61 UNIT/L (ref 17–59)
BASOPHILS # BLD AUTO: 0.04 X10(3)/MCL (ref 0.01–0.08)
BASOPHILS NFR BLD AUTO: 0.7 % (ref 0.1–1.2)
BILIRUB SERPL-MCNC: 0.9 MG/DL (ref 0–1)
BUN SERPL-MCNC: 14 MG/DL (ref 7–20)
CALCIUM SERPL-MCNC: 8.7 MG/DL (ref 8.4–10.2)
CHLORIDE SERPL-SCNC: 103 MMOL/L (ref 98–110)
CO2 SERPL-SCNC: 28 MMOL/L (ref 21–32)
CREAT SERPL-MCNC: 0.75 MG/DL (ref 0.66–1.25)
CREAT/UREA NIT SERPL: 19 (ref 12–20)
EOSINOPHIL # BLD AUTO: 0.17 X10(3)/MCL (ref 0.04–0.54)
EOSINOPHIL NFR BLD AUTO: 2.8 % (ref 0.7–7)
ERYTHROCYTE [DISTWIDTH] IN BLOOD BY AUTOMATED COUNT: 13.9 %
GFR SERPLBLD CREATININE-BSD FMLA CKD-EPI: >90 MLS/MIN/1.73/M2
GLOBULIN SER-MCNC: 3.6 GM/DL (ref 2–3.9)
GLUCOSE SERPL-MCNC: 203 MG/DL (ref 70–115)
HCT VFR BLD AUTO: 38.6 % (ref 36–52)
HGB BLD-MCNC: 13.2 G/DL (ref 13–18)
IMM GRANULOCYTES # BLD AUTO: 0.01 X10(3)/MCL (ref 0–0.03)
IMM GRANULOCYTES NFR BLD AUTO: 0.2 % (ref 0–0.5)
INR PPP: 1.1
LYMPHOCYTES # BLD AUTO: 1.54 X10(3)/MCL (ref 1.32–3.57)
LYMPHOCYTES NFR BLD AUTO: 25.8 % (ref 20–55)
MAGNESIUM SERPL-MCNC: 2 MG/DL (ref 1.8–2.4)
MCH RBC QN AUTO: 32.5 PG (ref 27–34)
MCHC RBC AUTO-ENTMCNC: 34.2 G/DL (ref 31–37)
MCV RBC AUTO: 95.1 FL (ref 79–99)
MONOCYTES # BLD AUTO: 1.27 X10(3)/MCL (ref 0.3–0.82)
MONOCYTES NFR BLD AUTO: 21.2 % (ref 4.7–12.5)
NEUTROPHILS # BLD AUTO: 2.95 X10(3)/MCL (ref 1.78–5.38)
NEUTROPHILS NFR BLD AUTO: 49.3 % (ref 37–73)
NRBC BLD AUTO-RTO: 0 %
PLATELET # BLD AUTO: 179 X10(3)/MCL (ref 140–371)
PMV BLD AUTO: 10.2 FL (ref 9.4–12.4)
POTASSIUM SERPL-SCNC: 4.3 MMOL/L (ref 3.5–5.1)
PROT SERPL-MCNC: 7.3 GM/DL (ref 6.3–8.2)
PROTHROMBIN TIME: 10.8 SECONDS (ref 9.3–11.9)
RBC # BLD AUTO: 4.06 X10(6)/MCL (ref 4–6)
SODIUM SERPL-SCNC: 136 MMOL/L (ref 135–145)
TROPONIN I SERPL-MCNC: <0.012 NG/ML (ref 0–0.03)
WBC # SPEC AUTO: 5.98 X10(3)/MCL (ref 4–11.5)

## 2024-02-22 PROCEDURE — 84484 ASSAY OF TROPONIN QUANT: CPT | Performed by: FAMILY MEDICINE

## 2024-02-22 PROCEDURE — 85610 PROTHROMBIN TIME: CPT | Performed by: FAMILY MEDICINE

## 2024-02-22 PROCEDURE — 80053 COMPREHEN METABOLIC PANEL: CPT | Performed by: FAMILY MEDICINE

## 2024-02-22 PROCEDURE — 83735 ASSAY OF MAGNESIUM: CPT | Performed by: FAMILY MEDICINE

## 2024-02-22 PROCEDURE — 85730 THROMBOPLASTIN TIME PARTIAL: CPT | Performed by: FAMILY MEDICINE

## 2024-02-22 PROCEDURE — 99284 EMERGENCY DEPT VISIT MOD MDM: CPT | Mod: 25

## 2024-02-22 PROCEDURE — 85025 COMPLETE CBC W/AUTO DIFF WBC: CPT | Performed by: FAMILY MEDICINE

## 2024-02-22 RX ORDER — SPIRONOLACTONE 100 MG/1
50 TABLET, FILM COATED ORAL DAILY
COMMUNITY
Start: 2024-02-21

## 2024-02-22 NOTE — TELEPHONE ENCOUNTER
Reason for Disposition   MODERATE - SEVERE SWELLING of abdomen (e.g., looks very distended or swollen) that is NEW-ONSET or much worse    Additional Information   Negative: Sounds like a life-threatening emergency to the triager   Negative: Chest pain    Protocols used: Abdomen Bloating and Swelling-A-OH  Pt states he has abdominal pain and swelling making him sob. States he had 5 liters of fluid removed on 2/7/24 and his doctor was supposed to order fluid removal again.     Pt states the swelling is worse now. Pt advised to have someone bring to the ED now for evaluation. Pt verbalized understanding.

## 2024-02-23 ENCOUNTER — HOSPITAL ENCOUNTER (OUTPATIENT)
Dept: RADIOLOGY | Facility: HOSPITAL | Age: 64
Discharge: HOME | End: 2024-02-23
Attending: FAMILY MEDICINE
Payer: MEDICARE

## 2024-02-23 VITALS
TEMPERATURE: 98 F | HEART RATE: 86 BPM | SYSTOLIC BLOOD PRESSURE: 128 MMHG | DIASTOLIC BLOOD PRESSURE: 40 MMHG | RESPIRATION RATE: 16 BRPM | OXYGEN SATURATION: 98 %

## 2024-02-23 DIAGNOSIS — R18.0 ASCITES, MALIGNANT: ICD-10-CM

## 2024-02-23 LAB — POCT GLUCOSE: 187 MG/DL (ref 70–110)

## 2024-02-23 PROCEDURE — 49083 ABD PARACENTESIS W/IMAGING: CPT

## 2024-02-23 PROCEDURE — 25000003 PHARM REV CODE 250: Performed by: FAMILY MEDICINE

## 2024-02-23 RX ORDER — LIDOCAINE HYDROCHLORIDE 10 MG/ML
10 INJECTION INFILTRATION; PERINEURAL ONCE
Status: COMPLETED | OUTPATIENT
Start: 2024-02-23 | End: 2024-02-23

## 2024-02-23 RX ORDER — IBUPROFEN 600 MG/1
600 TABLET ORAL EVERY 6 HOURS PRN
OUTPATIENT
Start: 2024-02-23

## 2024-02-23 RX ADMIN — LIDOCAINE HYDROCHLORIDE 7 ML: 10 INJECTION, SOLUTION INFILTRATION; PERINEURAL at 08:02

## 2024-02-23 NOTE — ED PROVIDER NOTES
Encounter Date: 2/22/2024       History     Chief Complaint   Patient presents with    Ascites    Shortness of Breath     Pt reports he needs his fluid taken off of his stomach. Pt reported he had 2 paracentesis done on 01/22 (\[facility]") and 02/07 ([facility]). Patient reports swelling in hands and feet and increased shortness of breath. Patient does have distended abdomen.     Patient presents for evaluation of shortness of breath.  Patient has a history of end-stage liver failure with cirrhosis and has had repeated paracentesis over the past month.  Patient notes having increasing distention and pain and shortness of breath.  Symptoms have worsened over the past several days.  Patient denies fevers chills or any other associated symptoms at present.  Ambulation worsened symptoms.  Lying down also worsened symptoms    The history is provided by the patient.     Review of patient's allergies indicates:   Allergen Reactions    Iodine     Codeine Itching     Past Medical History:   Diagnosis Date    Ascites     Chronic pain     Diabetes mellitus     Hypertension     Unspecified viral hepatitis C without hepatic coma      Past Surgical History:   Procedure Laterality Date    BACK SURGERY      GALLBLADDER SURGERY       History reviewed. No pertinent family history.  Social History     Tobacco Use    Smoking status: Never    Smokeless tobacco: Never   Substance Use Topics    Alcohol use: Not Currently    Drug use: Never     Review of Systems   Constitutional: Negative.    HENT: Negative.     Eyes: Negative.    Respiratory: Negative.     Gastrointestinal:  Positive for abdominal distention.   Endocrine: Negative.    Genitourinary: Negative.    Musculoskeletal: Negative.    Skin: Negative.    Allergic/Immunologic: Negative.    Neurological: Negative.    Hematological: Negative.    Psychiatric/Behavioral: Negative.         Physical Exam     Initial Vitals [02/22/24 1927]   BP Pulse Resp Temp SpO2   (!) 141/80 92 20 97.8 °F (36.6  °C) 97 %      MAP       --         Physical Exam    Vitals reviewed.  Constitutional: He appears well-developed.   HENT:   Head: Normocephalic and atraumatic.   Eyes: EOM are normal. Pupils are equal, round, and reactive to light.   Neck:   Normal range of motion.  Cardiovascular:  Normal rate.           Abdominal: Abdomen is soft. Bowel sounds are normal. He exhibits distension. There is abdominal tenderness.   Musculoskeletal:         General: Normal range of motion.      Cervical back: Normal range of motion.     Neurological: He is alert. He has normal reflexes.   Skin: Skin is warm.   Psychiatric: He has a normal mood and affect.         ED Course   Procedures  Labs Reviewed   COMPREHENSIVE METABOLIC PANEL - Abnormal; Notable for the following components:       Result Value    Glucose Level 203 (*)     Alkaline Phosphatase 181 (*)     Alanine Aminotransferase 48 (*)     Aspartate Aminotransferase 61 (*)     All other components within normal limits   CBC WITH DIFFERENTIAL - Abnormal; Notable for the following components:    Mono % 21.2 (*)     Mono # 1.27 (*)     All other components within normal limits   MAGNESIUM - Normal   TROPONIN I - Normal   PROTIME-INR - Normal    Narrative:     Protimes are used to monitor anticoagulant agents such as warfarin. PT INR values are based on the current patient normal mean and the WHITNEY value for the specific instrument reagent used.  **Routine theraputic target values for the INR are 2.0-3.0**   CBC W/ AUTO DIFFERENTIAL    Narrative:     The following orders were created for panel order CBC auto differential.  Procedure                               Abnormality         Status                     ---------                               -----------         ------                     CBC with Differential[[id number]]       Abnormal            Final result                 Please view results for these tests on the individual orders.   APTT          Imaging Results               X-Ray Chest 1 View (Final result)  Result time 02/22/24 20:20:36      Final result by César Giles MD (02/22/24 20:20:36)                   Impression:      No radiographic evidence of an acute cardiopulmonary process.      Electronically signed by: César Giles  Date:    02/22/2024  Time:    20:20               Narrative:    EXAMINATION:  XR CHEST 1 VIEW    CLINICAL HISTORY:  Other ascites    COMPARISON:  Chest radiograph 06/24/2019    FINDINGS:  Single AP chest radiograph is provided for evaluation.    Cardiac silhouette is normal in size.    Stable prominence of the interstitial lung markings.    No focal consolidation, pneumothorax, or pleural effusion.    No acute osseous findings.                                       Medications - No data to display  Medical Decision Making  Amount and/or Complexity of Data Reviewed  Labs: ordered.  Radiology: ordered.                                      Clinical Impression:  Final diagnoses:  [R18.8] Ascites (Primary)          ED Disposition Condition    Discharge Stable          ED Prescriptions    None       Follow-up Information    None          Sabino Swenson MD  02/22/24 0492

## 2024-02-23 NOTE — ED NOTES
Spoke with caesar in radiology about setting up paracentesis for am. He will call back with answers. Pt is stable at this time in no distress

## 2024-02-28 ENCOUNTER — HOSPITAL ENCOUNTER (OUTPATIENT)
Dept: PREADMISSION TESTING | Facility: HOSPITAL | Age: 64
Discharge: HOME | End: 2024-02-28
Attending: FAMILY MEDICINE
Payer: MEDICARE

## 2024-02-28 VITALS — HEIGHT: 73 IN | WEIGHT: 220.38 LBS | BODY MASS INDEX: 29.21 KG/M2

## 2024-02-28 DIAGNOSIS — Z01.818 PREOP TESTING: ICD-10-CM

## 2024-02-28 DIAGNOSIS — K70.11 ASCITES DUE TO ALCOHOLIC HEPATITIS: Primary | ICD-10-CM

## 2024-02-28 NOTE — DISCHARGE INSTRUCTIONS
Pre-Operative Instructions     ARRIVE AT 5:30 AM    Please follow the instructions listed below. Pre-op Anesthesia and Sedation Information Sheet    · Should you develop a cold, temperature, sore throat, cough or any other indication of illness prior to your scheduled procedure, please notify your physician.    · DO NOT EAT OR DRINK ANYTHING AFTER 12:00 MIDNIGHT the night before your surgery or the morning of your procedure until after your procedure is completed and instructed by your nurse. This includes coffee, water, gum, and mints. You may brush your teeth, but do not swallow any water. Do not use any tobacco products the morning of the procedure (including dips, chews, smoking).    · Take all scheduled morning heart/blood pressure, respiratory, and/or thyroid medication prior to arrival on the day of the procedure with a sip of water.    · If you are diabetic, do not take any diabetic medication the day of your surgery.    · If you have medications you take routinely, bring the bottles with you to the hospital.    · Please bathe and shampoo your hair before coming to the hospital.    · Dress casually and wear loose, comfortable clothing. Please remove all make-up and nail polish. You will be given a patient gown for surgery. You must remove all undergarments, jewelry, and dentures unless instructed otherwise. Please leave all valuables at home or in the care of your family.    · Family members are welcome to come with you to the hospital and stay with you in your room.    · Any questions about your arrival time can be directed to [phone].    · Any questions or further information should be directed to the out-patient nurse at [phone] between the hours of 8:00 a.m. and 3:00 p.m., Monday through Friday.    · You will need a responsible adult to drive you home when you are released from the hospital. An adult/parent must remain in the hospital at all times with a pediatric patient.    · Day of surgery: Go to  [facility].           Patient Information Advice for Surgery Patients    · If you are having surgery or any other invasive procedure, make sure that you and the health care professionals treating you all agree on exactly what will be done during the surgery or procedure.    · Verify the information on your patient identity bracelet. Alert a member of the health care team if the information is incorrect and insist that it be replaced immediately.    · Make sure the operative consent you sign includes the correct information about your surgical site (i.e., right or left) and procedure. Thoroughly read all medical forms and make sure you understand them before you sign anything.    · Some surgical sites will be marked. If appropriate your surgical site will be marked by your physician, actively participate in the process.    · Ask questions and speak up if you have any concerns. Keep asking questions until you understand the answers. Ask members of the healthcare team what steps will be taken to ensure your safety and correct site surgery.    · Take a responsible family member or friend to accompany you to your doctors visits and on the day of your surgery or procedure so that they can serve as your advocate and speak up for you if you are unable.    · Make sure that any post-surgery instructions are given to you prior to your surgery, preferably in writing. Also, make sure a friend or family member is present when the directions are being given. This will help reduce your overall stress and ensure that as many parties as possible are fully informed.          Preventing Surgical Site Infections    What is a Surgical Site Infection (SSI)?    A surgical site infection is an infection that occurs after surgery in the part of the body where the surgery took place. Most patients who have surgery do not develop an infection. However, infections develop in about 1 to 3 out of every 100 patients who have  surgery.  Some of the common symptoms of a surgical site infection are:  Redness and pain around the area where you had surgery ,  Drainage of cloudy fluid from your surgical wound,  Fever    Can SSIs be treated?    Yes. Most surgical site infections can be treated with antibiotics. The antibiotic given to you depends on the bacteria (germs) causing the infection.    What are some of the things that hospitals are doing to prevent SSIs?    To prevent SSIs, doctors, nurses, and other healthcare providers:   Clean their hands and arms up to their elbows with an antiseptic agent just before the surgery.   Clean their hands with soap and water or an alcohol-based hand rub before and after caring for each patient.   May remove some of your hair immediately before your surgery using electric clippers if the hair is in the same area where the procedure will occur. They should not shave you with a razor.   Wear special hair covers, masks, gowns, and gloves during surgery to keep the surgery area clean.   Give you antibiotics before your surgery starts. In most cases, you should get antibiotics within 60 minutes before the surgery starts and the antibiotics should be stopped within 24 hours after surgery.   Clean the skin at the site of your surgery with a special soap that kills germs.    What can I do to help prevent SSIs?    Before your surgery:     Tell your doctor about other medical problems you may have. Health problems such as allergies, diabetes, and obesity could affect your surgery and your treatment.   Quit smoking. Patients who smoke get more infections. Talk to your doctor about how you can quit.   Do not shave near where you will have surgery. Shaving with a razor can irritate your skin and make it easier to develop an infection.    After your surgery:     Make sure that your healthcare providers clean their hands before examining you, either with soap and water or an alcohol-based hand rub.   Family and  friends who visit you should not touch the surgical wound or dressings.   Family and friends should clean their hands with soap and water or an alcohol-based hand rub before and after visiting you. If you do not see them clean their hands, ask them to clean their hands.    What do I need to do when I go home from the hospital?     Before you go home, your doctor or nurse should explain everything you need to know about taking care of your wound. Make sure you understand how to care for your wound before you leave the hospital.   Always clean your hands before and after caring for your wound.   Before you go home, make sure you know who to contact if you have questions or problems.   If you have any symptoms of an infection, such as redness and pain at the surgery site, drainage, or fever, call your doctor immediately.    If you have additional questions, please ask your doctor or nurse.    If you do not see your providers clean their hands, please ask them to do so.

## 2024-04-05 ENCOUNTER — TELEPHONE (OUTPATIENT)
Dept: TRANSPLANT | Facility: CLINIC | Age: 64
End: 2024-04-05
Payer: MEDICARE

## 2024-04-05 ENCOUNTER — HOSPITAL ENCOUNTER (OUTPATIENT)
Dept: PREADMISSION TESTING | Facility: HOSPITAL | Age: 64
Discharge: HOME | End: 2024-04-05
Attending: FAMILY MEDICINE
Payer: MEDICARE

## 2024-04-05 VITALS — BODY MASS INDEX: 28.36 KG/M2 | WEIGHT: 214 LBS | HEIGHT: 73 IN

## 2024-04-05 DIAGNOSIS — R18.8 ASCITIC FLUID: ICD-10-CM

## 2024-04-05 DIAGNOSIS — Z01.811 PRE-OP CHEST EXAM: Primary | ICD-10-CM

## 2024-04-05 NOTE — TELEPHONE ENCOUNTER
----- Message from Gopalhaydee Arturo sent at 4/5/2024  4:08 PM CDT -----    Only rec'matthew referral form on pt; no recs. Called ref MD office, but there was no answer. LVM for them to fax pt recs to [phone].    Will submit request via [employer].     Jonatan Torres MD  Phone: [phone]  Fax: [phone]

## 2024-04-05 NOTE — TELEPHONE ENCOUNTER
----- Message from Annmarie Morris sent at 4/5/2024  4:54 PM CDT -----    Hepatology referral received and scanned into media; pt chart sent to referral nurse for medical review.       Referring Provider: Jonatan Torres MD  Phone: [phone]  Fax: [phone]    .

## 2024-04-09 ENCOUNTER — TELEPHONE (OUTPATIENT)
Dept: TRANSPLANT | Facility: CLINIC | Age: 64
End: 2024-04-09
Payer: MEDICARE

## 2024-04-09 NOTE — TELEPHONE ENCOUNTER
Referral received fromReferring Provider: Jonatan Torres MD   Phone: [phone]   Fax: [phone]    Referred for tips or transplant  Patient with HEP C CIRRHOSIS  MELD 8  ICD-10:  K74.60 B19.20  Referred for liver transplant for CONSULT .    Referral completed and forwarded to [facility].          Insurance:   PRIMARY:   ID:  Contact #     SECONDARY:   ID:  Contact #

## 2024-04-16 ENCOUNTER — TELEPHONE (OUTPATIENT)
Dept: TRANSPLANT | Facility: CLINIC | Age: 64
End: 2024-04-16
Payer: MEDICARE

## 2024-04-16 NOTE — TELEPHONE ENCOUNTER
----- Message from Annmarie Morris sent at 4/16/2024  5:01 PM CDT -----  Regarding: FW: Appt  Contact: Pt  [phone]    Returned call to pt to [facility] an appt, but he told me that he will have to call back when his wife is home to [facility]. They will call back.  .  ----- Message -----  From: Telma Matamoros  Sent: 4/16/2024   4:50 PM CDT  To: [facility] Liver Referral Pool  Subject: Appt                                                       Caller:  Hever      Returning call to:   Teion      Caller can be reached at:  [phone]    Nature of the call: Returning missed call to schedule appt

## 2024-04-16 NOTE — TELEPHONE ENCOUNTER
----- Message from Annmarie Morris sent at 4/16/2024  4:23 PM CDT -----    Called pt [phone] (Mobile) to [facility] an appt, but there was no answer. LVM for them to call back to [facility] an appt.  .

## 2024-04-17 ENCOUNTER — TELEPHONE (OUTPATIENT)
Dept: TRANSPLANT | Facility: CLINIC | Age: 64
End: 2024-04-17
Payer: MEDICARE

## 2024-04-17 NOTE — TELEPHONE ENCOUNTER
"----- Message from Annmarie Morris sent at 4/17/2024  4:45 PM CDT -----  Regarding: FW: miss call    Called and sp to pt wife; they would like to come on a Fr. Appt grant'ed for 5/10.  .  ----- Message -----  From: Annmarie Morris  Sent: 4/17/2024   4:18 PM CDT  To: Annmarie Morris  Subject: FW: miss call                                    Name Of Caller: Self         Contact Preference?:[phone]         What is the nature of the call?: Returning call to [facility]         Additional Notes:   "Thank you for all that you do for our patients"  ----- Message -----  From: Wilfredo Rosenberg  Sent: 4/17/2024   4:16 PM CDT  To: [facility] Pre-Liver Transplant Non-Clinical  Subject: miss call                                        Consult/Advisory:        Name Of Caller: Self        Contact Preference?:[phone]        What is the nature of the call?: Returning call to [facility]        Additional Notes:  "Thank you for all that you do for our patients"  "

## 2024-04-23 LAB — BEAKER SEE SCANNED REPORT: NORMAL

## 2024-05-07 DIAGNOSIS — Z76.82 ORGAN TRANSPLANT CANDIDATE: ICD-10-CM

## 2024-05-07 DIAGNOSIS — K74.60 HEPATIC CIRRHOSIS, UNSPECIFIED HEPATIC CIRRHOSIS TYPE, UNSPECIFIED WHETHER ASCITES PRESENT: Primary | ICD-10-CM

## 2024-05-10 ENCOUNTER — OFFICE VISIT (OUTPATIENT)
Dept: TRANSPLANT | Facility: CLINIC | Age: 64
End: 2024-05-10
Payer: MEDICARE

## 2024-05-10 VITALS
TEMPERATURE: 98 F | SYSTOLIC BLOOD PRESSURE: 106 MMHG | RESPIRATION RATE: 18 BRPM | HEIGHT: 71 IN | OXYGEN SATURATION: 95 % | WEIGHT: 210.75 LBS | DIASTOLIC BLOOD PRESSURE: 61 MMHG | HEART RATE: 86 BPM | BODY MASS INDEX: 29.5 KG/M2

## 2024-05-10 DIAGNOSIS — Z86.19 HISTORY OF HEPATITIS C: ICD-10-CM

## 2024-05-10 DIAGNOSIS — Z86.39 HISTORY OF OBESITY: ICD-10-CM

## 2024-05-10 DIAGNOSIS — I85.11 SECONDARY ESOPHAGEAL VARICES WITH BLEEDING: ICD-10-CM

## 2024-05-10 DIAGNOSIS — R18.8 OTHER ASCITES: ICD-10-CM

## 2024-05-10 DIAGNOSIS — Z79.4 TYPE 2 DIABETES MELLITUS WITH OTHER SPECIFIED COMPLICATION, WITH LONG-TERM CURRENT USE OF INSULIN: ICD-10-CM

## 2024-05-10 DIAGNOSIS — Z76.82 ORGAN TRANSPLANT CANDIDATE: ICD-10-CM

## 2024-05-10 DIAGNOSIS — B19.20 DECOMPENSATED CIRRHOSIS RELATED TO HEPATITIS C VIRUS (HCV): ICD-10-CM

## 2024-05-10 DIAGNOSIS — K76.82 HEPATIC ENCEPHALOPATHY: ICD-10-CM

## 2024-05-10 DIAGNOSIS — K74.69 DECOMPENSATED CIRRHOSIS RELATED TO HEPATITIS C VIRUS (HCV): ICD-10-CM

## 2024-05-10 DIAGNOSIS — E11.69 TYPE 2 DIABETES MELLITUS WITH OTHER SPECIFIED COMPLICATION, WITH LONG-TERM CURRENT USE OF INSULIN: ICD-10-CM

## 2024-05-10 PROCEDURE — 99999 PR PBB SHADOW E&M-EST. PATIENT-LVL IV: CPT | Mod: PBBFAC,TXP,, | Performed by: INTERNAL MEDICINE

## 2024-05-10 PROCEDURE — 99205 OFFICE O/P NEW HI 60 MIN: CPT | Mod: S$PBB,TXP,, | Performed by: INTERNAL MEDICINE

## 2024-05-10 PROCEDURE — 99214 OFFICE O/P EST MOD 30 MIN: CPT | Mod: PBBFAC,TXP | Performed by: INTERNAL MEDICINE

## 2024-05-10 RX ORDER — INSULIN ASPART 100 [IU]/ML
INJECTION, SOLUTION INTRAVENOUS; SUBCUTANEOUS
COMMUNITY
Start: 2024-04-16

## 2024-05-10 RX ORDER — BLOOD-GLUCOSE TRANSMITTER
EACH MISCELLANEOUS
COMMUNITY
Start: 2024-03-19

## 2024-05-10 RX ORDER — BLOOD-GLUCOSE SENSOR
EACH MISCELLANEOUS
COMMUNITY
Start: 2024-03-19

## 2024-05-10 RX ORDER — BLOOD-GLUCOSE,RECEIVER,CONT
EACH MISCELLANEOUS
COMMUNITY
Start: 2024-03-19

## 2024-05-10 RX ORDER — LACTULOSE 10 G/15ML
15 SOLUTION ORAL 2 TIMES DAILY
Qty: 1380 ML | Refills: 11 | Status: SHIPPED | OUTPATIENT
Start: 2024-05-10 | End: 2025-05-10

## 2024-05-10 RX ORDER — LACTULOSE 10 G/15ML
15 SOLUTION ORAL 2 TIMES DAILY
Qty: 1380 ML | Refills: 11 | Status: SHIPPED | OUTPATIENT
Start: 2024-05-10 | End: 2024-05-10

## 2024-05-10 NOTE — PROGRESS NOTES
Transplant Hepatology   Transplant Evaluation Consult Note    Referring provider: Dr. Titus Santana *  PCP: Titus Santana III, MD    Chief complaint: HCV cirrhosis, transplant evaluation    HPI: Ashutosh Morris is a 63 y.o. male who was referred to Transplant [facility] for hepatitis C related cirrhosis. He is accompanied by his wife, Suni, and son, Shon. Reports being diagnosed with hepatitis C in the 1980s. Thinks he contracted hepatitis through blood transfusion in 1984. Treated with IFN x2 in the early 2000s without cure. Treated with Harvoni with cure (can't remember exact date). He was diagnosed with cirrhosis within the last 5 years.     Regarding risk factors for liver disease, he denies prior history of alcohol abuse. Quit drinking alcohol 5 years ago. No tattoos. He denies history of IV drug use. No known family history of liver disease. MASLD risk factors: DM Type 2 >10 years, obesity with heaviest weight ~280lb in 1990s --> 210lb. Denies prior diagnosis of HTN, HLD, LUC.     Regarding decompensating events, he has been diagnosed with ascites for which he takes Lasix 40mg qd, Aldactone 100mg qd. He required prior paracenteses, last 2/23/2024 with 5.3L removed. He reports prior variceal bleeding x1 treated with banding ~2018. Denies prior diagnosis of hepatic encephalopathy, but notes some new confusion / brain fog and forgetfulness. Denies prior episodes of jaundice.     Denies prior history of cardiopulmonary disease. Denies prior MI.     Abdominal surgeries include cholecystectomy. Notes anterior incision for back surgery. Denies weight loss surgery.       since 1991. Has one adult son, age 27. 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 Has been on disability since 1987. Lives in [place], [place]. Former smoker x45 years. Quit 5 years ago.     Past Medical History:   Diagnosis Date    Ascites     Chronic pain     Diabetes mellitus      Hypertension     Unspecified cirrhosis of liver     Unspecified viral hepatitis C without hepatic coma        Past Surgical History:   Procedure Laterality Date    BACK SURGERY      GALLBLADDER SURGERY         No family history on file.    Social History     Tobacco Use    Smoking status: Never     Passive exposure: Never    Smokeless tobacco: Never   Substance Use Topics    Alcohol use: Not Currently    Drug use: Never       Current Outpatient Medications   Medication Sig Dispense Refill    amitriptyline (ELAVIL) 75 MG tablet Take 75 mg by mouth every evening.      DEXCOM G6  Misc UAD. DX:E11.65      DEXCOM G6 SENSOR Fay SMARTSIG:Topical Every 10 Days      DEXCOM G6 TRANSMITTER Fay Change q 3 months. DX:E11.65      gabapentin (NEURONTIN) 800 MG tablet Take 800 mg by mouth 3 (three) times daily.      glimepiride (AMARYL) 4 MG tablet Take 4 mg by mouth 2 (two) times daily.      insulin NPH hum/reg insulin hm (NOVOLIN 70/30 U-100 INSULIN SUBQ) Inject 24 Units into the skin every evening.      NOVOLIN 70/30 U-100 INSULIN 100 unit/mL (70-30) injection Inject 48 Units into the skin once daily.      NOVOLOG FLEXPEN U-100 INSULIN 100 unit/mL (3 mL) InPn pen Inject into the skin as needed (sliding scale).      omeprazole (PRILOSEC) 40 MG capsule Take 40 mg by mouth every morning.      oxyCODONE (ROXICODONE) 10 mg Tab immediate release tablet Take 10 mg by mouth 3 (three) times daily.      OZEMPIC 1 mg/dose (4 mg/3 mL) Inject 2 mg into the skin every 7 days.      promethazine (PHENERGAN) 25 MG tablet Take 25 mg by mouth as needed.      propranoloL (INDERAL) 10 MG tablet Take 10 mg by mouth 3 (three) times daily.      spironolactone (ALDACTONE) 100 MG tablet Take 50 mg by mouth once daily.      tamsulosin (FLOMAX) 0.4 mg Cap Take 1 capsule by mouth every evening.      lactulose (CHRONULAC) 20 gram/30 mL Soln Take 23 mLs (15 g total) by mouth 2 (two) times daily. 1380 mL 11    pioglitazone (ACTOS) 45 MG tablet Take  "45 mg by mouth every evening. (Patient not taking: Reported on 5/10/2024)       No current facility-administered medications for this visit.       Review of patient's allergies indicates:   Allergen Reactions    Iodine Other (See Comments)     Decreased kidney functions.    Codeine Itching            Vitals:    05/10/24 1421   BP: 106/61   Pulse: 86   Resp: 18   Temp: 97.7 °F (36.5 °C)   TempSrc: Tympanic   SpO2: 95%   Weight: 95.6 kg (210 lb 12.2 oz)   Height: 5' 11.38" (1.813 m)   Body mass index is 29.08 kg/m².                                                                                                                                          Physical Exam  Constitutional:       General: He is not in acute distress.  Eyes:      General: No scleral icterus.  Cardiovascular:      Rate and Rhythm: Normal rate.   Pulmonary:      Effort: Pulmonary effort is normal.   Abdominal:      General: Abdomen is protuberant. There is no distension.      Palpations: Abdomen is soft. There is no fluid wave, hepatomegaly or splenomegaly.      Tenderness: There is no abdominal tenderness.   Musculoskeletal:      Right lower leg: No edema.      Left lower leg: No edema.   Skin:     General: Skin is warm.      Comments: No spider angiomas. No palmar erythema. No leukonychia.    Neurological:      General: No focal deficit present.      Mental Status: He is alert and oriented to person, place, and time.      Comments: +asterixis.   Psychiatric:         Behavior: Behavior is cooperative.         Thought Content: Thought content normal.         LABS: I personally reviewed pertinent laboratory findings.    Lab Results   Component Value Date    ALT 36 05/10/2024    AST 40 05/10/2024     (H) 05/10/2024    ALKPHOS 144 (H) 05/10/2024    BILITOT 1.0 05/10/2024       Lab Results   Component Value Date    WBC 7.96 05/10/2024    HGB 14.1 05/10/2024    HCT 43.2 05/10/2024    MCV 99 (H) 05/10/2024     (L) 05/10/2024       Lab " Results   Component Value Date     05/10/2024    K 4.9 05/10/2024     05/10/2024    CO2 28 05/10/2024    BUN 13 05/10/2024    CREATININE 1.1 05/10/2024    CALCIUM 9.6 05/10/2024    ANIONGAP 7 (L) 05/10/2024       Lab Results   Component Value Date    INR 1.1 05/10/2024    INR 1.1 02/22/2024    INR 1.2 02/06/2024    PROTIME 10.8 02/22/2024    PROTIME 11.8 02/06/2024       Imaging:  I personally reviewed recent imaging studies available on the chart and from outside medical records.    US Abdomen Complete 2/6/2024  FINDINGS:  Liver: The liver demonstrates a minimally heterogeneous echogenic appearance with subtle underlying nodularity and moderate overall atrophy as can be seen with cirrhosis.  No focal hepatic abnormalities are appreciated.  Duplex imaging demonstrates adequate antegrade flow and waveforms within the portal vein.     Gallbladder/biliary System: The patient is post cholecystectomy.The common bile duct, where visualized, is not dilated nor do I see evidence of intraductal stones.     Pancreas:Partially obscured by overlying bowel gas with no gross abnormalities noted.     Abdominal aorta:No clinically significant abnormalities are noted.     Inferior vena cava:No clinically significant abnormalities are noted.     Spleen:No clinically significant abnormalities are noted.     Kidneys: No clinically significant abnormalities are noted.     Miscellaneous: A prominent amount of ascites is noted throughout the abdomen.    Assessment:  63 y.o. male with hepatitis C and MASH related cirrhosis. He is decompensated with ascites, HE, history of variceal bleeding.    1. Decompensated cirrhosis related to hepatitis C virus (HCV)    2. Other ascites    3. Secondary esophageal varices with bleeding    4. Hepatic encephalopathy    5. History of hepatitis C    6. Organ transplant candidate    7. Type 2 diabetes mellitus with other specified complication, with long-term current use of insulin    8. History  of obesity        MELD 3.0: 8 at 5/10/2024 12:55 PM  MELD-Na: 8 at 5/10/2024 12:55 PM  Calculated from:  Serum Creatinine: 1.1 mg/dL at 5/10/2024 12:55 PM  Serum Sodium: 139 mmol/L (Using max of 137 mmol/L) at 5/10/2024 12:55 PM  Total Bilirubin: 1.0 mg/dL at 5/10/2024 12:55 PM  Serum Albumin: 3.5 g/dL at 5/10/2024 12:55 PM  INR(ratio): 1.1 at 5/10/2024 12:55 PM  Age at listing (hypothetical): 63 years  Sex: Male at 5/10/2024 12:55 PM        Transplant Candidacy: Patient is a 63 y.o. male with hepatitis C and MASH related cirrhosis referred for evaluation for possible OLT. MELD 3.0 8. Based on available information, he is a potential liver transplant candidate. However, the patient's prior decompensating events have been well-managed medically. Plan to monitor closely for worsening hepatic decompensations, and can reconsider transplant evaluation at that time.    Recommendations:  Ascites/Edema: Well-controlled. Continue Lasix 40 mg daily and Aldactone 100 mg daily. LVP as needed. Na 2g diet.     Encephalopathy: No prior diagnosis. Notes some confusion / brain fog. Asterixis +. Start lactulose, titrate 3-4 BM/day.     Variceal screening: EGD in 2018 for bleeding EV (report not available). Due for repeat EGD for EV surveillance.    HCC screening: US in 2/6/24 without focal lesion. AFP <2.0 (5/10/24). Repeat abdominal US and AFP every 6 months.    Immunizations: Immune to HAV but not HBV. Recommend HBV vaccination    CRC screening: Unknown if patient has had prior colonoscopy.    History of HCV: Treated with IFN x2 in the early 2000s without cure. Treated with Harvoni with cure (can't remember exact date). Check HCV RNA at next visit.    MASLD: Suspect a component of MASLD given history of obesity (heaviest weight ~280lb in 1990s), DM type 2.     UNOS Patient Status  Functional Status: 60% - Requires occasional assistance but is able to care for needs  Physical Capacity: Limited Mobility    Diabetes: No  Any previous  malignancy: No  Neoadjuvant Therapy: no  Has patient ever had a dx of HCC: no  Previous Abdominal Surgery: yes  Spontaneous Bacterial Peritonitis: no  History of Portal Vein Thrombosis: no  Transjugular Intrahepatic Portosystemic Shunt: no    Return to clinic in 3 months.    I have sent communication to the referring physician and/or primary care provider.    I spent a total of 60 minutes on the day of the visit. This includes face to face time and non-face to face time preparing to see the patient (eg, review of tests), obtaining and/or reviewing separately obtained history, documenting clinical information in the electronic or other health record, independently interpreting results, and communicating results to the patient/family/caregiver, or care coordination.    Sally Perkins MD  Staff Physician  Hepatology and Liver Transplant  [facility]  [facility] [place]

## 2024-05-10 NOTE — Clinical Note
No plan for liver transplant evaluation at this time.  Please help schedule RTC in Hepatology Clinic in 3 months. Thank you.

## 2024-05-10 NOTE — LETTER
May 13, 2024        Jonatan Torres  [facility] [address]  [address]  [facility]  [place] [address]  Phone: [phone]  Fax: [phone]             [facility] [address]  [address]  [facility] [address]  Phone: [phone]   Patient: Ashutosh Morris   MR Number: [id number]   YOB: 1960   Date of Visit: 5/10/2024       Dear Dr. Jonatan Torres    Thank you for referring Ashutosh Morris to me for evaluation. Attached you will find relevant portions of my assessment and plan of care.    If you have questions, please do not hesitate to call me. I look forward to following Ashutosh Morris along with you.    Sincerely,    Sally Perkins MD    Enclosure    If you would like to receive this communication electronically, please contact [email or web address] or [phone] to request [employer] access.    [employer] is a tool which provides read-only access to select patient information with whom you have a relationship. Its easy to use and provides real time access to review your patients record including encounter summaries, notes, results, and demographic information.    If you feel you have received this communication in error or would no longer like to receive these types of communications, please e-mail [email or web address]

## 2024-05-13 PROBLEM — Z79.4 TYPE 2 DIABETES MELLITUS, WITH LONG-TERM CURRENT USE OF INSULIN: Status: ACTIVE | Noted: 2024-05-13

## 2024-05-13 PROBLEM — K76.82 HEPATIC ENCEPHALOPATHY: Status: ACTIVE | Noted: 2024-05-13

## 2024-05-13 PROBLEM — K74.69 DECOMPENSATED CIRRHOSIS RELATED TO HEPATITIS C VIRUS (HCV): Status: ACTIVE | Noted: 2024-05-13

## 2024-05-13 PROBLEM — Z86.19 HISTORY OF HEPATITIS C: Status: ACTIVE | Noted: 2024-05-13

## 2024-05-13 PROBLEM — Z76.82 ORGAN TRANSPLANT CANDIDATE: Status: ACTIVE | Noted: 2024-05-13

## 2024-05-13 PROBLEM — I85.11 SECONDARY ESOPHAGEAL VARICES WITH BLEEDING: Status: ACTIVE | Noted: 2024-05-13

## 2024-05-13 PROBLEM — E11.9 TYPE 2 DIABETES MELLITUS, WITH LONG-TERM CURRENT USE OF INSULIN: Status: ACTIVE | Noted: 2024-05-13

## 2024-05-13 PROBLEM — B19.20 DECOMPENSATED CIRRHOSIS RELATED TO HEPATITIS C VIRUS (HCV): Status: ACTIVE | Noted: 2024-05-13

## 2024-05-13 PROBLEM — R18.8 OTHER ASCITES: Status: ACTIVE | Noted: 2024-05-13

## 2024-05-13 PROBLEM — Z86.39 HISTORY OF OBESITY: Status: ACTIVE | Noted: 2024-05-13

## 2024-05-15 ENCOUNTER — TELEPHONE (OUTPATIENT)
Dept: TRANSPLANT | Facility: CLINIC | Age: 64
End: 2024-05-15
Payer: MEDICARE

## 2024-05-15 ENCOUNTER — TELEPHONE (OUTPATIENT)
Dept: HEPATOLOGY | Facility: CLINIC | Age: 64
End: 2024-05-15
Payer: MEDICARE

## 2024-05-16 ENCOUNTER — TELEPHONE (OUTPATIENT)
Dept: HEPATOLOGY | Facility: CLINIC | Age: 64
End: 2024-05-16
Payer: MEDICARE

## 2024-05-16 NOTE — TELEPHONE ENCOUNTER
----- Message from Cielo Torreslolis sent at 5/15/2024  3:46 PM CDT -----  NOT A TRANSPLANT CANDIDATE AT THIS TIME. Please schedule for follow up in 3 mths.        Recommendations:  1. Ascites/Edema: Well-controlled. Continue Lasix 40 mg daily and Aldactone 100 mg daily. LVP as needed. Na 2g diet.      2. Encephalopathy: No prior diagnosis. Notes some confusion / brain fog. Asterixis +. Start lactulose, titrate 3-4 BM/day.      3. Variceal screening: EGD in 2018 for bleeding EV (report not available). Due for repeat EGD for EV surveillance.     4. HCC screening: US in 2/6/24 without focal lesion. AFP <2.0 (5/10/24). Repeat abdominal US and AFP every 6 months.     5. Immunizations: Immune to HAV but not HBV. Recommend HBV vaccination     6. CRC screening: Unknown if patient has had prior colonoscopy.     7. History of HCV: Treated with IFN x2 in the early 2000s without cure. Treated with Harvoni with cure (can't remember exact date). Check HCV RNA at next visit.       Return to clinic in 3 months.

## 2024-05-17 ENCOUNTER — HOSPITAL ENCOUNTER (EMERGENCY)
Facility: HOSPITAL | Age: 64
Discharge: HOME | End: 2024-05-17
Attending: FAMILY MEDICINE
Payer: MEDICARE

## 2024-05-17 VITALS
TEMPERATURE: 98 F | OXYGEN SATURATION: 96 % | SYSTOLIC BLOOD PRESSURE: 133 MMHG | WEIGHT: 214 LBS | BODY MASS INDEX: 28.99 KG/M2 | RESPIRATION RATE: 20 BRPM | DIASTOLIC BLOOD PRESSURE: 69 MMHG | HEART RATE: 86 BPM | HEIGHT: 72 IN

## 2024-05-17 DIAGNOSIS — I95.1 ORTHOSTATIC HYPOTENSION: Primary | ICD-10-CM

## 2024-05-17 LAB
ALBUMIN SERPL-MCNC: 3.7 G/DL (ref 3.4–5)
ALBUMIN/GLOB SERPL: 1.1 RATIO
ALP SERPL-CCNC: 172 UNIT/L (ref 50–144)
ALT SERPL-CCNC: 34 UNIT/L (ref 1–45)
ANION GAP SERPL CALC-SCNC: 8 MEQ/L (ref 2–13)
AST SERPL-CCNC: 49 UNIT/L (ref 17–59)
BASOPHILS # BLD AUTO: 0.05 X10(3)/MCL (ref 0.01–0.08)
BASOPHILS NFR BLD AUTO: 0.7 % (ref 0.1–1.2)
BILIRUB SERPL-MCNC: 0.7 MG/DL (ref 0–1)
BUN SERPL-MCNC: 19 MG/DL (ref 7–20)
CALCIUM SERPL-MCNC: 8.5 MG/DL (ref 8.4–10.2)
CHLORIDE SERPL-SCNC: 104 MMOL/L (ref 98–110)
CO2 SERPL-SCNC: 25 MMOL/L (ref 21–32)
CREAT SERPL-MCNC: 0.9 MG/DL (ref 0.66–1.25)
CREAT/UREA NIT SERPL: 21 (ref 12–20)
EOSINOPHIL # BLD AUTO: 0.13 X10(3)/MCL (ref 0.04–0.54)
EOSINOPHIL NFR BLD AUTO: 1.8 % (ref 0.7–7)
ERYTHROCYTE [DISTWIDTH] IN BLOOD BY AUTOMATED COUNT: 14.1 %
GFR SERPLBLD CREATININE-BSD FMLA CKD-EPI: >90 ML/MIN/1.73/M2
GLOBULIN SER-MCNC: 3.4 GM/DL (ref 2–3.9)
GLUCOSE SERPL-MCNC: 185 MG/DL (ref 70–115)
HCT VFR BLD AUTO: 40.1 % (ref 36–52)
HGB BLD-MCNC: 13.3 G/DL (ref 13–18)
IMM GRANULOCYTES # BLD AUTO: 0.01 X10(3)/MCL (ref 0–0.03)
IMM GRANULOCYTES NFR BLD AUTO: 0.1 % (ref 0–0.5)
LYMPHOCYTES # BLD AUTO: 1.61 X10(3)/MCL (ref 1.32–3.57)
LYMPHOCYTES NFR BLD AUTO: 22.7 % (ref 20–55)
MAGNESIUM SERPL-MCNC: 2.3 MG/DL (ref 1.8–2.4)
MCH RBC QN AUTO: 32.4 PG (ref 27–34)
MCHC RBC AUTO-ENTMCNC: 33.2 G/DL (ref 31–37)
MCV RBC AUTO: 97.8 FL (ref 79–99)
MONOCYTES # BLD AUTO: 1.64 X10(3)/MCL (ref 0.3–0.82)
MONOCYTES NFR BLD AUTO: 23.1 % (ref 4.7–12.5)
NEUTROPHILS # BLD AUTO: 3.66 X10(3)/MCL (ref 1.78–5.38)
NEUTROPHILS NFR BLD AUTO: 51.6 % (ref 37–73)
PLATELET # BLD AUTO: 140 X10(3)/MCL (ref 140–371)
PMV BLD AUTO: 11.3 FL (ref 9.4–12.4)
POTASSIUM SERPL-SCNC: 4.1 MMOL/L (ref 3.5–5.1)
PROT SERPL-MCNC: 7.1 GM/DL (ref 6.3–8.2)
RBC # BLD AUTO: 4.1 X10(6)/MCL (ref 4–6)
SODIUM SERPL-SCNC: 137 MMOL/L (ref 136–145)
WBC # SPEC AUTO: 7.1 X10(3)/MCL (ref 4–11.5)

## 2024-05-17 PROCEDURE — 85025 COMPLETE CBC W/AUTO DIFF WBC: CPT | Performed by: FAMILY MEDICINE

## 2024-05-17 PROCEDURE — 99284 EMERGENCY DEPT VISIT MOD MDM: CPT | Mod: 25

## 2024-05-17 PROCEDURE — 80053 COMPREHEN METABOLIC PANEL: CPT | Performed by: FAMILY MEDICINE

## 2024-05-17 PROCEDURE — 83735 ASSAY OF MAGNESIUM: CPT | Performed by: FAMILY MEDICINE

## 2024-05-17 PROCEDURE — 25000003 PHARM REV CODE 250: Performed by: FAMILY MEDICINE

## 2024-05-17 PROCEDURE — 96360 HYDRATION IV INFUSION INIT: CPT

## 2024-05-17 RX ADMIN — SODIUM CHLORIDE 1000 ML: 9 INJECTION, SOLUTION INTRAVENOUS at 02:05

## 2024-05-17 NOTE — ED PROVIDER NOTES
Encounter Date: 5/17/2024       History     Chief Complaint   Patient presents with    Dizziness     Pt was sent by Dr Roldan's office to ER with low BP of 90/50 while pt was at office this morning. Pt reports that he has been having dizzy spells when he stands up and has seen Dr Santana for it approx 3 weeks ago and was told to cut back on his Propranolol but pt has not done so yet. Pt denies any symptoms at this time but states that he does have the dizzy feeling when he stands up most of the time.     Patient complains of dizziness with standing.  This has been ongoing for the past 2 months.  It is intermittent.  He reports no fevers chills sweats.  Reports no cough or shortness a breath.  He reports no chest pain.  He reports no nausea or vomiting.  He was advised for medication adjustment but has not done so yet.  Dr. Santana is his PCP.  He has a history of stage IV cirrhosis of the liver.        Review of patient's allergies indicates:   Allergen Reactions    Iodine Other (See Comments)     Decreased kidney functions.    Codeine Itching     Past Medical History:   Diagnosis Date    Ascites     Chronic pain     Diabetes mellitus     Hypertension     Unspecified cirrhosis of liver     Unspecified viral hepatitis C without hepatic coma      Past Surgical History:   Procedure Laterality Date    BACK SURGERY      GALLBLADDER SURGERY       No family history on file.  Social History     Tobacco Use    Smoking status: Never     Passive exposure: Never    Smokeless tobacco: Never   Substance Use Topics    Alcohol use: Not Currently    Drug use: Never     Review of Systems   Respiratory: Negative.     Cardiovascular: Negative.    Gastrointestinal:  Positive for vomiting. Negative for constipation, diarrhea and nausea.   Musculoskeletal:  Positive for back pain.   Neurological:  Positive for dizziness.       Physical Exam     Initial Vitals [05/17/24 1227]   BP Pulse Resp Temp SpO2   125/64 91 20 97.7 °F (36.5 °C) 96 %       MAP       --         Physical Exam    Constitutional: He appears well-developed and well-nourished.   HENT:   Head: Normocephalic and atraumatic.   Neck: Neck supple.   Normal range of motion.  Cardiovascular:  Normal rate, regular rhythm and normal heart sounds.           Pulmonary/Chest: Breath sounds normal.   Abdominal: Abdomen is soft. Bowel sounds are normal.   Tenderness to the right upper quadrant   Musculoskeletal:         General: Normal range of motion.      Cervical back: Normal range of motion and neck supple.     Neurological: He is alert and oriented to person, place, and time.   Skin: Skin is warm and dry.         ED Course   Procedures  Labs Reviewed   COMPREHENSIVE METABOLIC PANEL - Abnormal; Notable for the following components:       Result Value    Glucose 185 (*)      (*)     BUN/Creatinine Ratio 21 (*)     All other components within normal limits   CBC WITH DIFFERENTIAL - Abnormal; Notable for the following components:    Mono % 23.1 (*)     Mono # 1.64 (*)     All other components within normal limits   MAGNESIUM - Normal   CBC W/ AUTO DIFFERENTIAL    Narrative:     The following orders were created for panel order CBC auto differential.  Procedure                               Abnormality         Status                     ---------                               -----------         ------                     CBC with Differential[[id number]]       Abnormal            Final result                 Please view results for these tests on the individual orders.          Imaging Results    None          Medications   sodium chloride 0.9% bolus 1,000 mL 1,000 mL (1,000 mLs Intravenous New Bag 5/17/24 1401)     Medical Decision Making  Amount and/or Complexity of Data Reviewed  Labs: ordered.                                      Clinical Impression:  Final diagnoses:  [I95.1] Orthostatic hypotension (Primary)                 Esteban Patel MD  05/17/24 4512

## 2024-08-12 PROBLEM — I85.11 SECONDARY ESOPHAGEAL VARICES WITH BLEEDING: Status: RESOLVED | Noted: 2024-05-13 | Resolved: 2024-08-12

## 2024-08-16 ENCOUNTER — TELEPHONE (OUTPATIENT)
Dept: HEPATOLOGY | Facility: CLINIC | Age: 64
End: 2024-08-16

## 2024-08-16 ENCOUNTER — OFFICE VISIT (OUTPATIENT)
Dept: HEPATOLOGY | Facility: CLINIC | Age: 64
End: 2024-08-16
Payer: MEDICARE

## 2024-08-16 DIAGNOSIS — K74.69 DECOMPENSATED CIRRHOSIS RELATED TO HEPATITIS C VIRUS (HCV): ICD-10-CM

## 2024-08-16 DIAGNOSIS — Z78.9 IMMUNE TO HEPATITIS A: ICD-10-CM

## 2024-08-16 DIAGNOSIS — K76.6 PORTAL HYPERTENSION: ICD-10-CM

## 2024-08-16 DIAGNOSIS — R18.8 OTHER ASCITES: ICD-10-CM

## 2024-08-16 DIAGNOSIS — Z23 NEED FOR HEPATITIS B VACCINATION: ICD-10-CM

## 2024-08-16 DIAGNOSIS — K76.82 HEPATIC ENCEPHALOPATHY: ICD-10-CM

## 2024-08-16 DIAGNOSIS — Z86.19 HEPATITIS C VIRUS INFECTION CURED AFTER ANTIVIRAL DRUG THERAPY: ICD-10-CM

## 2024-08-16 DIAGNOSIS — B19.20 DECOMPENSATED CIRRHOSIS RELATED TO HEPATITIS C VIRUS (HCV): ICD-10-CM

## 2024-08-16 NOTE — PROGRESS NOTES
Hepatology Follow Up Note    Referring provider: No ref. provider found  PCP: Titus Santana III, MD    The patient location is: [place]  The chief complaint leading to consultation is: decompensated cirrhosis     Visit type: audiovisual    Face to Face time with patient: 10 minutes.  30 minutes of total time spent on the encounter, which includes face to face time and non-face to face time preparing to see the patient (eg, review of tests), Obtaining and/or reviewing separately obtained history, Documenting clinical information in the electronic or other health record, Independently interpreting results (not separately reported) and communicating results to the patient/family/caregiver, or Care coordination (not separately reported).     Each patient to whom he or she provides medical services by telemedicine is:  (1) informed of the relationship between the physician and patient and the respective role of any other health care provider with respect to management of the patient; and (2) notified that he or she may decline to receive medical services by telemedicine and may withdraw from such care at any time.      Last seen in clinic on: 5/10/2024 in [facility]    Brief HPI:  Ashutosh Morris is a 64 y.o. 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    Interval Events:  - Accompanied by wife, Suni.  - Feels ok. Denies acute complaints.  - Thinks fluid is well-controlled. He is taking spironolactone 50mg qd, and thinks he may be taking furosemide. Has not required a paracentesis since Feb 2024.  - Has been taking lactulose. Having ~1 BM/day. Suni thinks brain fog / forgetfulness is improved.  - Off propanolol due to hypotension and orthostasis.  - The patient denies abdominal distension, encephalopathy, jaundice, gross GI bleeding.  - This is the extent of the patient's complaints at  this time.      Past Medical History:   Diagnosis Date    Ascites     Chronic pain     Diabetes mellitus     Hypertension     Unspecified cirrhosis of liver     Unspecified viral hepatitis C without hepatic coma        Past Surgical History:   Procedure Laterality Date    BACK SURGERY      GALLBLADDER SURGERY         No family history on file.    Social History     Tobacco Use    Smoking status: Never     Passive exposure: Never    Smokeless tobacco: Never   Substance Use Topics    Alcohol use: Not Currently    Drug use: Never       Current Outpatient Medications   Medication Sig Dispense Refill    amitriptyline (ELAVIL) 75 MG tablet Take 75 mg by mouth every evening.      DEXCOM G6  Misc UAD. DX:E11.65      DEXCOM G6 SENSOR Fay SMARTSIG:Topical Every 10 Days      DEXCOM G6 TRANSMITTER Fay Change q 3 months. DX:E11.65      gabapentin (NEURONTIN) 800 MG tablet Take 800 mg by mouth 3 (three) times daily.      glimepiride (AMARYL) 4 MG tablet Take 4 mg by mouth 2 (two) times daily.      insulin NPH hum/reg insulin hm (NOVOLIN 70/30 U-100 INSULIN SUBQ) Inject 24 Units into the skin every evening.      lactulose (CHRONULAC) 20 gram/30 mL Soln Take 23 mLs (15 g total) by mouth 2 (two) times daily. 1380 mL 11    NOVOLIN 70/30 U-100 INSULIN 100 unit/mL (70-30) injection Inject 48 Units into the skin once daily.      NOVOLOG FLEXPEN U-100 INSULIN 100 unit/mL (3 mL) InPn pen Inject into the skin as needed (sliding scale).      omeprazole (PRILOSEC) 40 MG capsule Take 40 mg by mouth every morning.      oxyCODONE (ROXICODONE) 10 mg Tab immediate release tablet Take 10 mg by mouth 3 (three) times daily.      OZEMPIC 1 mg/dose (4 mg/3 mL) Inject 2 mg into the skin every 7 days.      pioglitazone (ACTOS) 45 MG tablet Take 45 mg by mouth every evening. (Patient not taking: Reported on 5/10/2024)      promethazine (PHENERGAN) 25 MG tablet Take 25 mg by mouth as needed.      propranoloL (INDERAL) 10 MG tablet Take 10 mg by  "mouth 3 (three) times daily.      spironolactone (ALDACTONE) 100 MG tablet Take 50 mg by mouth once daily.      tamsulosin (FLOMAX) 0.4 mg Cap Take 1 capsule by mouth every evening.       No current facility-administered medications for this visit.       Review of patient's allergies indicates:   Allergen Reactions    Iodine Other (See Comments)     Decreased kidney functions.    Codeine Itching            There were no vitals filed for this visit.  There is no height or weight on file to calculate BMI.    Physical Exam  Constitutional:       General: He is not in acute distress.     Appearance: He is not toxic-appearing.   Eyes:      General: No scleral icterus.  Skin:     Coloration: Skin is not jaundiced.   Neurological:      General: No focal deficit present.      Mental Status: He is alert.   Psychiatric:         Thought Content: Thought content normal.         LABS: I personally reviewed pertinent laboratory findings.    Lab Results   Component Value Date    WBC 7.10 05/17/2024    HGB 13.3 05/17/2024    HCT 40.1 05/17/2024    MCV 97.8 05/17/2024     05/17/2024       Lab Results   Component Value Date     05/17/2024    K 4.1 05/17/2024     05/17/2024    CO2 25 05/17/2024    BUN 19 05/17/2024    CREATININE 0.90 05/17/2024    CALCIUM 8.5 05/17/2024    ANIONGAP 7 (L) 05/10/2024       Lab Results   Component Value Date    ALT 34 05/17/2024    AST 49 05/17/2024     (H) 05/10/2024    ALKPHOS 172 (H) 05/17/2024    BILITOT 0.7 05/17/2024       Lab Results   Component Value Date    HEPBCAB Non-reactive 05/10/2024    HEPCAB Reactive (A) 05/10/2024       No results found for: "RAMESH", "MITOAB", "SMOOTHMUSCAB", "IGG", "CERULOPLSM"     MELD 3.0: 8 at 5/10/2024 12:55 PM  MELD-Na: 8 at 5/10/2024 12:55 PM  Calculated from:  Serum Creatinine: 1.1 mg/dL at 5/10/2024 12:55 PM  Serum Sodium: 139 mmol/L (Using max of 137 mmol/L) at 5/10/2024 12:55 PM  Total Bilirubin: 1.0 mg/dL at 5/10/2024 12:55 PM  Serum " Albumin: 3.5 g/dL at 5/10/2024 12:55 PM  INR(ratio): 1.1 at 5/10/2024 12:55 PM  Age at listing (hypothetical): 63 years  Sex: Male at 5/10/2024 12:55 PM       IMAGING: I personally reviewed imaging studies.      Assessment: Ashutosh Morris is a 64 y.o. 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    1. Decompensated cirrhosis related to hepatitis C virus (HCV)    2. Portal hypertension    3. Other ascites    4. Hepatic encephalopathy    5. Hepatitis C virus infection cured after antiviral drug therapy    6. Immune to hepatitis A    7. Need for hepatitis B vaccination      #Decompensated HCV Cirrhosis     Volume: Difficult to assess over video. Per pt, well-controlled. Continue spironolactone 50mg qd. He thinks he is taking furosemide as well. Na 2g diet.  Infection: No concern at this time.   Bleeding: Due for EGD for EV screening.  Encephalopathy: Continue lactulose, titrate to 3-4 BM/day.  Screening: Last AFP <2.0 (5/10/24). Last liver US w/o focal lesion (2/6/24). Repeat US and AFP q6 months.  Immunization: HAV immune. Recommend HBV vaccination.  Transplant: Low MELD. Hepatic decompensations are well-controlled at this time.    #History of HCV: Treated with IFN x2 in the early 2000s without cure. Treated with Harvoni with cure (can't remember exact date). Check HCV RNA.    Return to clinic in 3 months.    Sally Perkins MD  Staff Physician  Hepatology and Liver Transplant  [facility]  [facility] [place]

## 2024-08-16 NOTE — TELEPHONE ENCOUNTER
Pt was called labs and us were schld with them.      Aleyda    ----- Message from Sally Perkins MD sent at 8/16/2024  4:36 PM CDT -----  Please help the patient schedule labs and ultrasound within the next 1-2 weeks locally. RTC in 3 months in person only.

## 2024-08-16 NOTE — Clinical Note
Please help the patient schedule labs and ultrasound within the next 1-2 weeks locally. RTC in 3 months in person only.

## 2024-08-26 ENCOUNTER — HOSPITAL ENCOUNTER (OUTPATIENT)
Dept: RADIOLOGY | Facility: HOSPITAL | Age: 64
Discharge: HOME | End: 2024-08-26
Attending: INTERNAL MEDICINE
Payer: MEDICARE

## 2024-08-26 DIAGNOSIS — K76.82 HEPATIC ENCEPHALOPATHY: ICD-10-CM

## 2024-08-26 DIAGNOSIS — K74.69 DECOMPENSATED CIRRHOSIS RELATED TO HEPATITIS C VIRUS (HCV): ICD-10-CM

## 2024-08-26 DIAGNOSIS — Z86.19 HEPATITIS C VIRUS INFECTION CURED AFTER ANTIVIRAL DRUG THERAPY: ICD-10-CM

## 2024-08-26 DIAGNOSIS — R18.8 OTHER ASCITES: ICD-10-CM

## 2024-08-26 DIAGNOSIS — B19.20 DECOMPENSATED CIRRHOSIS RELATED TO HEPATITIS C VIRUS (HCV): ICD-10-CM

## 2024-08-26 PROCEDURE — 76705 ECHO EXAM OF ABDOMEN: CPT | Mod: TC

## 2024-10-24 ENCOUNTER — HOSPITAL ENCOUNTER (EMERGENCY)
Facility: HOSPITAL | Age: 64
Discharge: HOME | End: 2024-10-24
Attending: SURGERY
Payer: MEDICARE

## 2024-10-24 VITALS
HEART RATE: 87 BPM | TEMPERATURE: 98 F | SYSTOLIC BLOOD PRESSURE: 134 MMHG | DIASTOLIC BLOOD PRESSURE: 76 MMHG | WEIGHT: 218 LBS | RESPIRATION RATE: 16 BRPM | HEIGHT: 73 IN | BODY MASS INDEX: 28.89 KG/M2 | OXYGEN SATURATION: 98 %

## 2024-10-24 DIAGNOSIS — J20.8 ACUTE BACTERIAL BRONCHITIS: Primary | ICD-10-CM

## 2024-10-24 DIAGNOSIS — B96.89 ACUTE BACTERIAL BRONCHITIS: Primary | ICD-10-CM

## 2024-10-24 DIAGNOSIS — Z83.79 FAMILY HISTORY OF HEPATIC CIRRHOSIS: ICD-10-CM

## 2024-10-24 LAB
ALBUMIN SERPL-MCNC: 4.1 G/DL (ref 3.4–5)
ALBUMIN/GLOB SERPL: 1.2 RATIO
ALP SERPL-CCNC: 119 UNIT/L (ref 50–144)
ALT SERPL-CCNC: 48 UNIT/L (ref 1–45)
ANION GAP SERPL CALC-SCNC: 8 MEQ/L (ref 2–13)
AST SERPL-CCNC: 51 UNIT/L (ref 17–59)
BASOPHILS # BLD AUTO: 0.06 X10(3)/MCL (ref 0.01–0.08)
BASOPHILS NFR BLD AUTO: 0.5 % (ref 0.1–1.2)
BILIRUB SERPL-MCNC: 0.8 MG/DL (ref 0–1)
BILIRUB UR QL STRIP.AUTO: NEGATIVE
BUN SERPL-MCNC: 15 MG/DL (ref 7–20)
CALCIUM SERPL-MCNC: 9.2 MG/DL (ref 8.4–10.2)
CHLORIDE SERPL-SCNC: 109 MMOL/L (ref 98–110)
CLARITY UR: CLEAR
CO2 SERPL-SCNC: 22 MMOL/L (ref 21–32)
COLOR UR AUTO: YELLOW
CREAT SERPL-MCNC: 0.79 MG/DL (ref 0.66–1.25)
CREAT/UREA NIT SERPL: 19 (ref 12–20)
EOSINOPHIL # BLD AUTO: 0.07 X10(3)/MCL (ref 0.04–0.54)
EOSINOPHIL NFR BLD AUTO: 0.5 % (ref 0.7–7)
ERYTHROCYTE [DISTWIDTH] IN BLOOD BY AUTOMATED COUNT: 14.5 %
GFR SERPLBLD CREATININE-BSD FMLA CKD-EPI: >90 ML/MIN/1.73/M2
GLOBULIN SER-MCNC: 3.3 GM/DL (ref 2–3.9)
GLUCOSE SERPL-MCNC: 174 MG/DL (ref 70–115)
GLUCOSE UR QL STRIP: >=1000
HCT VFR BLD AUTO: 42.3 % (ref 36–52)
HGB BLD-MCNC: 14.2 G/DL (ref 13–18)
HGB UR QL STRIP: NEGATIVE
IMM GRANULOCYTES # BLD AUTO: 0.06 X10(3)/MCL (ref 0–0.03)
IMM GRANULOCYTES NFR BLD AUTO: 0.5 % (ref 0–0.5)
INFLUENZA A (OHS): NEGATIVE
INFLUENZA B (OHS): NEGATIVE
KETONES UR QL STRIP: NEGATIVE
LEUKOCYTE ESTERASE UR QL STRIP: NEGATIVE
LYMPHOCYTES # BLD AUTO: 1.8 X10(3)/MCL (ref 1.32–3.57)
LYMPHOCYTES NFR BLD AUTO: 13.7 % (ref 20–55)
MCH RBC QN AUTO: 31.9 PG (ref 27–34)
MCHC RBC AUTO-ENTMCNC: 33.6 G/DL (ref 31–37)
MCV RBC AUTO: 95.1 FL (ref 79–99)
MONOCYTES # BLD AUTO: 1.33 X10(3)/MCL (ref 0.3–0.82)
MONOCYTES NFR BLD AUTO: 10.1 % (ref 4.7–12.5)
NEUTROPHILS # BLD AUTO: 9.81 X10(3)/MCL (ref 1.78–5.38)
NEUTROPHILS NFR BLD AUTO: 74.7 % (ref 37–73)
NITRITE UR QL STRIP: NEGATIVE
PH UR STRIP: 6 [PH]
PLATELET # BLD AUTO: 142 X10(3)/MCL (ref 140–371)
PMV BLD AUTO: 10.4 FL (ref 9.4–12.4)
POTASSIUM SERPL-SCNC: 4.2 MMOL/L (ref 3.5–5.1)
PROT SERPL-MCNC: 7.4 GM/DL (ref 6.3–8.2)
PROT UR QL STRIP: NEGATIVE
RBC # BLD AUTO: 4.45 X10(6)/MCL (ref 4–6)
SARS-COV-2 RDRP RESP QL NAA+PROBE: NEGATIVE
SODIUM SERPL-SCNC: 139 MMOL/L (ref 136–145)
SP GR UR STRIP.AUTO: 1.02 (ref 1–1.03)
UROBILINOGEN UR STRIP-ACNC: 1
WBC # BLD AUTO: 13.13 X10(3)/MCL (ref 4–11.5)

## 2024-10-24 PROCEDURE — 80053 COMPREHEN METABOLIC PANEL: CPT | Performed by: SURGERY

## 2024-10-24 PROCEDURE — 99285 EMERGENCY DEPT VISIT HI MDM: CPT | Mod: 25

## 2024-10-24 PROCEDURE — U0002 COVID-19 LAB TEST NON-CDC: HCPCS | Performed by: SURGERY

## 2024-10-24 PROCEDURE — 25000242 PHARM REV CODE 250 ALT 637 W/ HCPCS: Performed by: SURGERY

## 2024-10-24 PROCEDURE — 81003 URINALYSIS AUTO W/O SCOPE: CPT | Performed by: SURGERY

## 2024-10-24 PROCEDURE — 63600175 PHARM REV CODE 636 W HCPCS: Performed by: SURGERY

## 2024-10-24 PROCEDURE — 85025 COMPLETE CBC W/AUTO DIFF WBC: CPT | Performed by: SURGERY

## 2024-10-24 PROCEDURE — 94640 AIRWAY INHALATION TREATMENT: CPT

## 2024-10-24 PROCEDURE — 96374 THER/PROPH/DIAG INJ IV PUSH: CPT

## 2024-10-24 PROCEDURE — 87400 INFLUENZA A/B EACH AG IA: CPT | Performed by: SURGERY

## 2024-10-24 RX ORDER — AZITHROMYCIN 250 MG/1
TABLET, FILM COATED ORAL
Qty: 6 TABLET | Refills: 0 | Status: SHIPPED | OUTPATIENT
Start: 2024-10-24 | End: 2024-10-29

## 2024-10-24 RX ORDER — CEFEPIME HYDROCHLORIDE 2 G/1
2 INJECTION, POWDER, FOR SOLUTION INTRAVENOUS ONCE
Status: COMPLETED | OUTPATIENT
Start: 2024-10-24 | End: 2024-10-24

## 2024-10-24 RX ORDER — IPRATROPIUM BROMIDE AND ALBUTEROL SULFATE 2.5; .5 MG/3ML; MG/3ML
3 SOLUTION RESPIRATORY (INHALATION) ONCE
Status: COMPLETED | OUTPATIENT
Start: 2024-10-24 | End: 2024-10-24

## 2024-10-24 RX ORDER — ALBUTEROL SULFATE 90 UG/1
2 INHALANT RESPIRATORY (INHALATION) EVERY 6 HOURS PRN
Qty: 8 G | Refills: 1 | Status: SHIPPED | OUTPATIENT
Start: 2024-10-24

## 2024-10-24 RX ADMIN — CEFEPIME 2 G: 2 INJECTION, POWDER, FOR SOLUTION INTRAVENOUS at 02:10

## 2024-10-24 RX ADMIN — IPRATROPIUM BROMIDE AND ALBUTEROL SULFATE 3 ML: .5; 3 SOLUTION RESPIRATORY (INHALATION) at 02:10

## 2024-10-29 ENCOUNTER — TELEPHONE (OUTPATIENT)
Dept: HEPATOLOGY | Facility: CLINIC | Age: 64
End: 2024-10-29
Payer: MEDICARE

## 2025-01-17 ENCOUNTER — OFFICE VISIT (OUTPATIENT)
Dept: HEPATOLOGY | Facility: CLINIC | Age: 65
End: 2025-01-17
Payer: MEDICARE

## 2025-01-17 ENCOUNTER — HOSPITAL ENCOUNTER (OUTPATIENT)
Dept: RADIOLOGY | Facility: HOSPITAL | Age: 65
Discharge: HOME | End: 2025-01-17
Attending: INTERNAL MEDICINE
Payer: MEDICARE

## 2025-01-17 VITALS
TEMPERATURE: 98 F | SYSTOLIC BLOOD PRESSURE: 123 MMHG | RESPIRATION RATE: 18 BRPM | BODY MASS INDEX: 29.77 KG/M2 | OXYGEN SATURATION: 98 % | WEIGHT: 224.63 LBS | HEART RATE: 82 BPM | HEIGHT: 73 IN | DIASTOLIC BLOOD PRESSURE: 62 MMHG

## 2025-01-17 DIAGNOSIS — K76.6 PORTAL HYPERTENSION: ICD-10-CM

## 2025-01-17 DIAGNOSIS — Z86.19 HEPATITIS C VIRUS INFECTION CURED AFTER ANTIVIRAL DRUG THERAPY: ICD-10-CM

## 2025-01-17 DIAGNOSIS — K74.69 DECOMPENSATED CIRRHOSIS RELATED TO HEPATITIS C VIRUS (HCV): ICD-10-CM

## 2025-01-17 DIAGNOSIS — K74.69 DECOMPENSATED LIVER DISEASE: ICD-10-CM

## 2025-01-17 DIAGNOSIS — K76.82 HEPATIC ENCEPHALOPATHY: ICD-10-CM

## 2025-01-17 DIAGNOSIS — B19.20 DECOMPENSATED CIRRHOSIS RELATED TO HEPATITIS C VIRUS (HCV): ICD-10-CM

## 2025-01-17 DIAGNOSIS — R18.8 OTHER ASCITES: ICD-10-CM

## 2025-01-17 PROCEDURE — 99999 PR PBB SHADOW E&M-EST. PATIENT-LVL V: CPT | Mod: PBBFAC,,, | Performed by: INTERNAL MEDICINE

## 2025-01-17 PROCEDURE — 99214 OFFICE O/P EST MOD 30 MIN: CPT | Mod: S$PBB,,, | Performed by: INTERNAL MEDICINE

## 2025-01-17 PROCEDURE — 76705 ECHO EXAM OF ABDOMEN: CPT | Mod: TC

## 2025-01-17 PROCEDURE — 93976 VASCULAR STUDY: CPT | Mod: 26,,, | Performed by: RADIOLOGY

## 2025-01-17 PROCEDURE — 76705 ECHO EXAM OF ABDOMEN: CPT | Mod: 26,59,, | Performed by: RADIOLOGY

## 2025-01-17 PROCEDURE — 99215 OFFICE O/P EST HI 40 MIN: CPT | Mod: PBBFAC,25 | Performed by: INTERNAL MEDICINE

## 2025-01-17 RX ORDER — DEXAMETHASONE 2 MG/1
6 TABLET ORAL
COMMUNITY
Start: 2025-01-15

## 2025-01-17 RX ORDER — DOXYCYCLINE HYCLATE 100 MG
100 TABLET ORAL 2 TIMES DAILY
COMMUNITY
Start: 2025-01-15

## 2025-01-17 RX ORDER — DAPAGLIFLOZIN AND METFORMIN HYDROCHLORIDE 10; 1000 MG/1; MG/1
1 TABLET, FILM COATED, EXTENDED RELEASE ORAL
COMMUNITY
Start: 2024-10-31

## 2025-01-17 RX ORDER — SYRING-NEEDL,DISP,INSUL,0.3 ML 31 GX5/16"
SYRINGE, EMPTY DISPOSABLE MISCELLANEOUS
COMMUNITY
Start: 2024-12-03

## 2025-01-17 NOTE — PROGRESS NOTES
Hepatology Follow Up Note    Referring provider: No ref. provider found  PCP: Titus Santana III, MD    Last seen in clinic on: 8/16/2024     Brief HPI:  Ashutosh Morris is a 64 y.o. 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    Interval Events:  - Accompanied by wife, Suni, and son.  - Went to ED in Oct 2024 with fever and URI.  - Reports new abdominal distension and discomfort. Taking spironolactone 50mg daily. Off Lasix. Denies recent paracentesis. Denies LE swelling. Per chart review, weight 210lb in 5/10/24 -->  224lb 1/17/25.   - Taking lactulose resulting in 3-4 BM per day. Continues to struggle with brain fog / memory loss.   - The patient denies jaundice, gross GI bleeding.  - This is the extent of the patient's complaints at this time.      Past Medical History:   Diagnosis Date    Ascites     Chronic pain     Diabetes mellitus     Hypertension     Unspecified cirrhosis of liver     Unspecified viral hepatitis C without hepatic coma        Past Surgical History:   Procedure Laterality Date    BACK SURGERY      GALLBLADDER SURGERY         No family history on file.    Social History     Tobacco Use    Smoking status: Never     Passive exposure: Never    Smokeless tobacco: Never   Substance Use Topics    Alcohol use: Not Currently    Drug use: Never       Current Outpatient Medications   Medication Sig Dispense Refill    albuterol (VENTOLIN HFA) 90 mcg/actuation inhaler Inhale 2 puffs into the lungs every 6 (six) hours as needed (COUGH). Rescue 8 g 1    amitriptyline (ELAVIL) 75 MG tablet Take 75 mg by mouth every evening.      dexAMETHasone (DECADRON) 2 MG tablet Take 6 mg by mouth.      doxycycline (VIBRA-TABS) 100 MG tablet Take 100 mg by mouth 2 (two) times daily.      gabapentin (NEURONTIN) 800 MG tablet Take 800 mg by mouth 3 (three) times daily.      glimepiride (AMARYL) 4 MG tablet Take 4  "mg by mouth 2 (two) times daily.      insulin NPH hum/reg insulin hm (NOVOLIN 70/30 U-100 INSULIN SUBQ) Inject 24 Units into the skin every evening.      lactulose (CHRONULAC) 20 gram/30 mL Soln Take 23 mLs (15 g total) by mouth 2 (two) times daily. 1380 mL 11    NOVOLIN 70/30 U-100 INSULIN 100 unit/mL (70-30) injection Inject 48 Units into the skin once daily.      NOVOLOG FLEXPEN U-100 INSULIN 100 unit/mL (3 mL) InPn pen Inject into the skin as needed (sliding scale).      omeprazole (PRILOSEC) 40 MG capsule Take 40 mg by mouth every morning.      oxyCODONE (ROXICODONE) 10 mg Tab immediate release tablet Take 10 mg by mouth 3 (three) times daily.      OZEMPIC 1 mg/dose (4 mg/3 mL) Inject 2 mg into the skin every 7 days.      promethazine (PHENERGAN) 25 MG tablet Take 25 mg by mouth as needed.      spironolactone (ALDACTONE) 100 MG tablet Take 50 mg by mouth once daily.      SURE COMFORT INSULIN SYRINGE 0.5 mL 31 gauge x 5/16" Syrg USE AS DIRECTED TO ADMINISTER INSULIN      tamsulosin (FLOMAX) 0.4 mg Cap Take 1 capsule by mouth every evening.      XIGDUO XR 10-1,000 mg TBph Take 1 tablet by mouth.       No current facility-administered medications for this visit.       Review of patient's allergies indicates:   Allergen Reactions    Codeine Itching            Vitals:    01/17/25 1038   BP: 123/62   Pulse: 82   Resp: 18   Temp: 98 °F (36.7 °C)   TempSrc: Oral   SpO2: 98%   Weight: 101.9 kg (224 lb 10.4 oz)   Height: 6' 1" (1.854 m)   Body mass index is 29.64 kg/m².    Physical Exam  Constitutional:       General: He is not in acute distress.     Appearance: He is not toxic-appearing.   Eyes:      General: No scleral icterus.  Cardiovascular:      Rate and Rhythm: Normal rate.   Pulmonary:      Effort: Pulmonary effort is normal.   Abdominal:      General: Abdomen is protuberant. There is no distension.      Palpations: Abdomen is soft. There is no fluid wave, hepatomegaly or splenomegaly.      Tenderness: There is " "abdominal tenderness in the right lower quadrant.   Musculoskeletal:      Right lower leg: No edema.      Left lower leg: No edema.   Skin:     General: Skin is warm.      Coloration: Skin is not jaundiced.   Neurological:      General: No focal deficit present.      Mental Status: He is alert and oriented to person, place, and time.      Comments: No asterixis.   Psychiatric:         Behavior: Behavior is cooperative.         Thought Content: Thought content normal.         LABS: I personally reviewed pertinent laboratory findings.    Lab Results   Component Value Date    WBC 13.13 (H) 10/24/2024    HGB 14.2 10/24/2024    HCT 42.3 10/24/2024    MCV 95.1 10/24/2024     10/24/2024       Lab Results   Component Value Date     10/24/2024    K 4.2 10/24/2024     10/24/2024    CO2 22 10/24/2024    BUN 15 10/24/2024    CREATININE 0.79 10/24/2024    CALCIUM 9.2 10/24/2024    ANIONGAP 7 (L) 05/10/2024       Lab Results   Component Value Date    ALT 48 (H) 10/24/2024    AST 51 10/24/2024     (H) 05/10/2024    ALKPHOS 119 10/24/2024    BILITOT 0.8 10/24/2024       Lab Results   Component Value Date    HEPBCAB Non-reactive 05/10/2024    HEPCAB Reactive (A) 05/10/2024       No results found for: "RAMESH", "MITOAB", "SMOOTHMUSCAB", "IGG", "CERULOPLSM"     MELD 3.0: 7 at 8/26/2024  9:40 AM  MELD-Na: 8 at 8/26/2024  9:40 AM  Calculated from:  Serum Creatinine: 0.81 mg/dL (Using min of 1 mg/dL) at 8/26/2024  9:40 AM  Serum Sodium: 139 mmol/L (Using max of 137 mmol/L) at 8/26/2024  9:40 AM  Total Bilirubin: 1.1 mg/dL at 8/26/2024  9:40 AM  Serum Albumin: 4 g/dL (Using max of 3.5 g/dL) at 8/26/2024  9:40 AM  INR(ratio): 1.1 at 8/26/2024  9:40 AM  Age at listing (hypothetical): 64 years  Sex: Male at 8/26/2024  9:40 AM       IMAGING: I personally reviewed imaging studies.      Assessment: Ashutosh Morris is a 64 y.o. male with decompensated HCV cirrhosis complicated by ascites, hepatic encephalopathy, HCV " treated with cure, who presents for scheduled follow up.    1. Decompensated cirrhosis related to hepatitis C virus (HCV)    2. Portal hypertension    3. Other ascites    4. Hepatic encephalopathy    5. Hepatitis C virus infection cured after antiviral drug therapy      #Decompensated HCV Cirrhosis     Volume: Abd protuberant, unclear if ascites present. No LE edema. Will obtain US abd to assess for ascites. Continue spironolactone 50mg qd for now. If ascites present, then will refer for diag paracentesis and will increase diuretics. Na 2g diet.  Infection: No concern at this time.   Bleeding: Due for EGD for EV screening. Pt will have EGD done locally with Dr. Torres   Encephalopathy: Continue lactulose, titrate to 3-4 BM/day.  Screening: Last AFP <2.0 (8/26/24). Last liver US w/o focal lesion (8/26/24). Repeat US and AFP q6 months.  Immunization: HAV immune. Recommend HBV vaccination.  Transplant: Low MELD. Hepatic decompensations are well-controlled at this time. Good social support.     #History of HCV: Treated with IFN x2 in the early 2000s without cure. Treated with Harvoni with cure (can't remember exact date). HCV RNA not detected 8/26/24.    Return to clinic in 3 months, virtual ok.    Sally Perkins MD  Staff Physician  Hepatology and Liver Transplant  [facility]  [facility] [place]

## 2025-04-22 ENCOUNTER — OFFICE VISIT (OUTPATIENT)
Dept: HEPATOLOGY | Facility: CLINIC | Age: 65
End: 2025-04-22
Payer: MEDICARE

## 2025-04-22 ENCOUNTER — TELEPHONE (OUTPATIENT)
Dept: HEPATOLOGY | Facility: CLINIC | Age: 65
End: 2025-04-22

## 2025-04-22 DIAGNOSIS — R18.8 ASCITES OF LIVER: ICD-10-CM

## 2025-04-22 DIAGNOSIS — K76.6 PORTAL HYPERTENSION: ICD-10-CM

## 2025-04-22 DIAGNOSIS — B19.20 DECOMPENSATED CIRRHOSIS RELATED TO HEPATITIS C VIRUS (HCV): ICD-10-CM

## 2025-04-22 DIAGNOSIS — K76.82 HEPATIC ENCEPHALOPATHY: ICD-10-CM

## 2025-04-22 DIAGNOSIS — K74.69 DECOMPENSATED CIRRHOSIS RELATED TO HEPATITIS C VIRUS (HCV): ICD-10-CM

## 2025-04-22 DIAGNOSIS — Z86.19 HEPATITIS C VIRUS INFECTION CURED AFTER ANTIVIRAL DRUG THERAPY: ICD-10-CM

## 2025-04-22 PROBLEM — Z76.82 ORGAN TRANSPLANT CANDIDATE: Status: RESOLVED | Noted: 2024-05-13 | Resolved: 2025-04-22

## 2025-04-22 PROCEDURE — 98006 SYNCH AUDIO-VIDEO EST MOD 30: CPT | Mod: 95,,, | Performed by: INTERNAL MEDICINE

## 2025-04-22 NOTE — PROGRESS NOTES
Hepatology Follow Up Note    Referring provider: No ref. provider found  PCP: Titus Santana III, MD    The patient location is: [place]  The chief complaint leading to consultation is: decompensated HCV cirrhosis    Visit type: audiovisual    Face to Face time with patient: 15 minutes.  30 minutes of total time spent on the encounter, which includes face to face time and non-face to face time preparing to see the patient (eg, review of tests), Obtaining and/or reviewing separately obtained history, Documenting clinical information in the electronic or other health record, Independently interpreting results (not separately reported) and communicating results to the patient/family/caregiver, or Care coordination (not separately reported).     Each patient to whom he or she provides medical services by telemedicine is:  (1) informed of the relationship between the physician and patient and the respective role of any other health care provider with respect to management of the patient; and (2) notified that he or she may decline to receive medical services by telemedicine and may withdraw from such care at any time.    Last seen in clinic on: 1/17/2025    Brief HPI:  Ashutosh Morris is a 64 y.o. 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    Interval Events:  - Accompanied by wife, Suni.  - Reports abdomen is swollen, but weight is stable. Taking spironolactone 50mg daily, Lasix 40mg daily. Denies recent paracentesis. Denies LE swelling.  - Taking lactulose resulting in 3-4 BM per day.   - The patient denies jaundice, gross GI bleeding.  - He has not had repeat EGD with local GI yet.  - Denies recent ED visits, hospitalizations.  - This is the extent of the patient's complaints at this time.    Past Medical History:   Diagnosis Date    Ascites     Chronic pain     Diabetes mellitus     Hypertension      "Unspecified cirrhosis of liver     Unspecified viral hepatitis C without hepatic coma        Past Surgical History:   Procedure Laterality Date    BACK SURGERY      GALLBLADDER SURGERY         No family history on file.    Social History     Tobacco Use    Smoking status: Never     Passive exposure: Never    Smokeless tobacco: Never   Substance Use Topics    Alcohol use: Not Currently    Drug use: Never       Current Outpatient Medications   Medication Sig Dispense Refill    albuterol (VENTOLIN HFA) 90 mcg/actuation inhaler Inhale 2 puffs into the lungs every 6 (six) hours as needed (COUGH). Rescue 8 g 1    amitriptyline (ELAVIL) 75 MG tablet Take 75 mg by mouth every evening.      dexAMETHasone (DECADRON) 2 MG tablet Take 6 mg by mouth.      doxycycline (VIBRA-TABS) 100 MG tablet Take 100 mg by mouth 2 (two) times daily.      gabapentin (NEURONTIN) 800 MG tablet Take 800 mg by mouth 3 (three) times daily.      glimepiride (AMARYL) 4 MG tablet Take 4 mg by mouth 2 (two) times daily.      insulin NPH hum/reg insulin hm (NOVOLIN 70/30 U-100 INSULIN SUBQ) Inject 24 Units into the skin every evening.      lactulose (CHRONULAC) 20 gram/30 mL Soln Take 23 mLs (15 g total) by mouth 2 (two) times daily. 1380 mL 11    NOVOLIN 70/30 U-100 INSULIN 100 unit/mL (70-30) injection Inject 48 Units into the skin once daily.      NOVOLOG FLEXPEN U-100 INSULIN 100 unit/mL (3 mL) InPn pen Inject into the skin as needed (sliding scale).      omeprazole (PRILOSEC) 40 MG capsule Take 40 mg by mouth every morning.      oxyCODONE (ROXICODONE) 10 mg Tab immediate release tablet Take 10 mg by mouth 3 (three) times daily.      OZEMPIC 1 mg/dose (4 mg/3 mL) Inject 2 mg into the skin every 7 days.      promethazine (PHENERGAN) 25 MG tablet Take 25 mg by mouth as needed.      spironolactone (ALDACTONE) 100 MG tablet Take 50 mg by mouth once daily.      SURE COMFORT INSULIN SYRINGE 0.5 mL 31 gauge x 5/16" Syrg USE AS DIRECTED TO ADMINISTER INSULIN " "     tamsulosin (FLOMAX) 0.4 mg Cap Take 1 capsule by mouth every evening.      XIGDUO XR 10-1,000 mg TBph Take 1 tablet by mouth.       No current facility-administered medications for this visit.       Review of patient's allergies indicates:   Allergen Reactions    Codeine Itching            There were no vitals filed for this visit.  There is no height or weight on file to calculate BMI.    Physical Exam  Constitutional:       General: He is not in acute distress.     Appearance: He is not toxic-appearing.   Eyes:      General: No scleral icterus.  Skin:     Coloration: Skin is not jaundiced.   Neurological:      General: No focal deficit present.      Mental Status: He is alert.   Psychiatric:         Thought Content: Thought content normal.       LABS: I personally reviewed pertinent laboratory findings.    Lab Results   Component Value Date    WBC 10.76 01/17/2025    HGB 13.3 (L) 01/17/2025    HCT 42.2 01/17/2025     (H) 01/17/2025     01/17/2025       Lab Results   Component Value Date     01/17/2025    K 4.7 01/17/2025     01/17/2025    CO2 22 (L) 01/17/2025    BUN 20 01/17/2025    CREATININE 0.9 01/17/2025    CALCIUM 9.3 01/17/2025    ANIONGAP 11 01/17/2025       Lab Results   Component Value Date    ALT 27 01/17/2025    AST 32 01/17/2025     (H) 05/10/2024    ALKPHOS 85 01/17/2025    BILITOT 0.5 01/17/2025       Lab Results   Component Value Date    HEPBCAB Non-reactive 05/10/2024    HEPCAB Reactive (A) 05/10/2024       No results found for: "RAMESH", "MITOAB", "SMOOTHMUSCAB", "IGG", "CERULOPLSM"     MELD 3.0: 7 at 1/17/2025  1:06 PM  MELD-Na: 7 at 1/17/2025  1:06 PM  Calculated from:  Serum Creatinine: 0.9 mg/dL (Using min of 1 mg/dL) at 1/17/2025  1:06 PM  Serum Sodium: 141 mmol/L (Using max of 137 mmol/L) at 1/17/2025  1:06 PM  Total Bilirubin: 0.5 mg/dL (Using min of 1 mg/dL) at 1/17/2025  1:06 PM  Serum Albumin: 3.5 g/dL at 1/17/2025  1:06 PM  INR(ratio): 1.1 at " 1/17/2025  1:06 PM  Age at listing (hypothetical): 64 years  Sex: Male at 1/17/2025  1:06 PM       IMAGING: I personally reviewed imaging studies.      Assessment: Ashutosh Morris is a 64 y.o. 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    1. Decompensated cirrhosis related to hepatitis C virus (HCV)    2. Portal hypertension    3. Ascites of liver    4. Hepatic encephalopathy    5. Hepatitis C virus infection cured after antiviral drug therapy      #Decompensated HCV Cirrhosis     Volume: Reports abdomen is swollen. Last US abd 1/17/25 with trace ascites. Repeat US abd to assess for ascites. Continue Lasix 40mg qd, Aldactone 50mg qd. If ascites present, then will refer for diag paracentesis and will increase diuretics. Na 2g diet.  Infection: No concern at this time.   Bleeding: Due for EGD for EV screening. Pt will have EGD done locally with Dr. Torres.   Encephalopathy: Continue lactulose, titrate to 3-4 BM/day.  Screening: Last AFP <2.0 (1/17/25). Last liver US w/o focal lesion (1/17/25). Repeat US and AFP q6 months.  Immunization: HAV immune. Recommend HBV vaccination.  Transplant: Low MELD. Hepatic decompensations are well-controlled at this time. Good social support. Have discussed the role of living donation with patient and wife. Patient does not identify living donors at this time. Will continue to monitor closely. Low threshold for transplant evaluation.    #History of HCV: Treated with IFN x2 in the early 2000s without cure. Treated with Harvoni with cure (can't remember exact date). HCV RNA not detected 8/26/24.    Return to clinic in 6 months in person.    Sally Perkins MD  Staff Physician  Hepatology and Liver Transplant  [facility]  [facility]

## 2025-04-22 NOTE — TELEPHONE ENCOUNTER
"Patient call returned to schedule appointments but patient has requested I call back tomorrow. I have made note to reach out to patient tomorrow.    KAMILA Johnson    ----- Message from Zacarias sent at 4/22/2025  1:50 PM CDT -----  Consult/AdvisoryName Of Caller: SelfContact Preference?: [phone] What is the nature of the call?: Returning call to officeAdditional Notes:"Thank you for all that you do for our patients"  "

## 2025-04-24 ENCOUNTER — TELEPHONE (OUTPATIENT)
Dept: HEPATOLOGY | Facility: CLINIC | Age: 65
End: 2025-04-24
Payer: MEDICARE

## 2025-04-24 NOTE — TELEPHONE ENCOUNTER
Patient was called and labs/ultrasound have been scheduled at patient's preferred location. Patient was notified that recall has been placed for 6 month follow-up in October 2025. Patient voiced agreement to time, date, location,and recall being placed. No further questions.    Elizabeth Guzman MA    ----- Message from Sally Perkins MD sent at 4/22/2025 11:36 AM CDT -----  Labs and ultrasound next available. RTC in 6 months in person. Thank you.

## 2025-08-01 ENCOUNTER — HOSPITAL ENCOUNTER (OUTPATIENT)
Dept: RADIOLOGY | Facility: HOSPITAL | Age: 65
Discharge: HOME | End: 2025-08-01
Attending: INTERNAL MEDICINE
Payer: MEDICARE

## 2025-08-01 DIAGNOSIS — K76.82 HEPATIC ENCEPHALOPATHY: ICD-10-CM

## 2025-08-01 DIAGNOSIS — R18.8 ASCITES OF LIVER: ICD-10-CM

## 2025-08-01 DIAGNOSIS — Z86.19 HEPATITIS C VIRUS INFECTION CURED AFTER ANTIVIRAL DRUG THERAPY: ICD-10-CM

## 2025-08-01 PROCEDURE — 76705 ECHO EXAM OF ABDOMEN: CPT | Mod: TC
